# Patient Record
Sex: MALE | Race: WHITE | NOT HISPANIC OR LATINO | Employment: FULL TIME | URBAN - METROPOLITAN AREA
[De-identification: names, ages, dates, MRNs, and addresses within clinical notes are randomized per-mention and may not be internally consistent; named-entity substitution may affect disease eponyms.]

---

## 2023-03-17 DIAGNOSIS — F41.9 ANXIETY: Primary | ICD-10-CM

## 2023-03-17 RX ORDER — HYDROXYZINE HYDROCHLORIDE 25 MG/1
TABLET, FILM COATED ORAL
COMMUNITY
Start: 2022-08-10 | End: 2023-03-17 | Stop reason: SDUPTHER

## 2023-03-17 RX ORDER — ESCITALOPRAM OXALATE 10 MG/1
1 TABLET ORAL DAILY
COMMUNITY
Start: 2022-08-10 | End: 2023-03-17 | Stop reason: SDUPTHER

## 2023-03-17 RX ORDER — AMLODIPINE BESYLATE 5 MG/1
1 TABLET ORAL DAILY
COMMUNITY
Start: 2020-12-18 | End: 2023-06-13 | Stop reason: SDUPTHER

## 2023-03-17 RX ORDER — SIMVASTATIN 10 MG/1
1 TABLET, FILM COATED ORAL DAILY
COMMUNITY
Start: 2021-09-20 | End: 2023-06-13 | Stop reason: SDUPTHER

## 2023-03-17 RX ORDER — NORTRIPTYLINE HYDROCHLORIDE 10 MG/1
CAPSULE ORAL
COMMUNITY
Start: 2020-11-01 | End: 2023-10-24 | Stop reason: ALTCHOICE

## 2023-03-17 RX ORDER — DESONIDE 0.5 MG/G
CREAM TOPICAL
COMMUNITY
Start: 2022-07-18 | End: 2023-10-24 | Stop reason: ALTCHOICE

## 2023-03-17 RX ORDER — METOPROLOL SUCCINATE 50 MG/1
1 TABLET, EXTENDED RELEASE ORAL DAILY
COMMUNITY
Start: 2020-12-18 | End: 2023-06-13 | Stop reason: SDUPTHER

## 2023-03-22 RX ORDER — COVID-19 MOLECULAR TEST ASSAY
KIT MISCELLANEOUS
COMMUNITY
Start: 2022-11-25 | End: 2023-10-24 | Stop reason: ALTCHOICE

## 2023-03-22 RX ORDER — ESCITALOPRAM OXALATE 10 MG/1
10 TABLET ORAL DAILY
Qty: 90 TABLET | Refills: 1 | Status: SHIPPED | OUTPATIENT
Start: 2023-03-22 | End: 2023-06-21 | Stop reason: SDUPTHER

## 2023-03-22 RX ORDER — NIRMATRELVIR AND RITONAVIR 300-100 MG
KIT ORAL
COMMUNITY
Start: 2022-07-31 | End: 2023-10-24 | Stop reason: ALTCHOICE

## 2023-03-22 RX ORDER — HYDROXYZINE HYDROCHLORIDE 25 MG/1
25 TABLET, FILM COATED ORAL EVERY 8 HOURS PRN
Qty: 30 TABLET | Refills: 1 | Status: SHIPPED | OUTPATIENT
Start: 2023-03-22 | End: 2023-12-20 | Stop reason: WASHOUT

## 2023-03-22 RX ORDER — HYDROXYZINE HYDROCHLORIDE 10 MG/1
TABLET, FILM COATED ORAL
COMMUNITY
Start: 2022-08-08

## 2023-03-22 RX ORDER — SODIUM PICOSULFATE, MAGNESIUM OXIDE, AND ANHYDROUS CITRIC ACID 10; 3.5; 12 MG/160ML; G/160ML; G/160ML
LIQUID ORAL
COMMUNITY
Start: 2022-01-11 | End: 2023-10-24 | Stop reason: ALTCHOICE

## 2023-06-13 DIAGNOSIS — I10 HTN (HYPERTENSION), BENIGN: Primary | ICD-10-CM

## 2023-06-13 DIAGNOSIS — E78.5 HYPERLIPIDEMIA, UNSPECIFIED HYPERLIPIDEMIA TYPE: ICD-10-CM

## 2023-06-14 RX ORDER — SIMVASTATIN 10 MG/1
10 TABLET, FILM COATED ORAL DAILY
Qty: 90 TABLET | Refills: 1 | Status: SHIPPED | OUTPATIENT
Start: 2023-06-14 | End: 2023-11-03 | Stop reason: SDUPTHER

## 2023-06-14 RX ORDER — AMLODIPINE BESYLATE 5 MG/1
5 TABLET ORAL DAILY
Qty: 90 TABLET | Refills: 1 | Status: SHIPPED | OUTPATIENT
Start: 2023-06-14 | End: 2023-12-11

## 2023-06-14 RX ORDER — METOPROLOL SUCCINATE 50 MG/1
50 TABLET, EXTENDED RELEASE ORAL DAILY
Qty: 90 TABLET | Refills: 1 | Status: SHIPPED | OUTPATIENT
Start: 2023-06-14 | End: 2023-12-11

## 2023-06-20 ENCOUNTER — OFFICE VISIT (OUTPATIENT)
Dept: PRIMARY CARE | Facility: CLINIC | Age: 68
End: 2023-06-20
Payer: COMMERCIAL

## 2023-06-20 VITALS
TEMPERATURE: 97.8 F | RESPIRATION RATE: 18 BRPM | SYSTOLIC BLOOD PRESSURE: 138 MMHG | WEIGHT: 165.2 LBS | HEIGHT: 67 IN | OXYGEN SATURATION: 91 % | DIASTOLIC BLOOD PRESSURE: 74 MMHG | HEART RATE: 73 BPM | BODY MASS INDEX: 25.93 KG/M2

## 2023-06-20 DIAGNOSIS — C61 ADENOCARCINOMA OF PROSTATE (MULTI): ICD-10-CM

## 2023-06-20 DIAGNOSIS — I10 BENIGN ESSENTIAL HYPERTENSION: Primary | ICD-10-CM

## 2023-06-20 DIAGNOSIS — E78.2 HYPERLIPEMIA, MIXED: ICD-10-CM

## 2023-06-20 DIAGNOSIS — F41.1 GENERALIZED ANXIETY DISORDER: ICD-10-CM

## 2023-06-20 DIAGNOSIS — G47.00 INSOMNIA, UNSPECIFIED TYPE: ICD-10-CM

## 2023-06-20 PROCEDURE — 1036F TOBACCO NON-USER: CPT | Performed by: FAMILY MEDICINE

## 2023-06-20 PROCEDURE — 1159F MED LIST DOCD IN RCRD: CPT | Performed by: FAMILY MEDICINE

## 2023-06-20 PROCEDURE — 3075F SYST BP GE 130 - 139MM HG: CPT | Performed by: FAMILY MEDICINE

## 2023-06-20 PROCEDURE — 99214 OFFICE O/P EST MOD 30 MIN: CPT | Performed by: FAMILY MEDICINE

## 2023-06-20 PROCEDURE — 3078F DIAST BP <80 MM HG: CPT | Performed by: FAMILY MEDICINE

## 2023-06-20 ASSESSMENT — ENCOUNTER SYMPTOMS
UNEXPECTED WEIGHT CHANGE: 0
PALPITATIONS: 0
HEADACHES: 0
ABDOMINAL PAIN: 0
SHORTNESS OF BREATH: 0
HYPERTENSION: 1

## 2023-06-20 NOTE — ASSESSMENT & PLAN NOTE
Elevated, recommend monitor home pressures  Continue current regimen  Avoid salty foods, maintain hydration

## 2023-06-20 NOTE — PROGRESS NOTES
"Subjective   Patient ID: Medardo Mcdonald is a 67 y.o. male who presents for Follow-up and Hypertension.    Hypertension  Pertinent negatives include no chest pain, headaches, palpitations or shortness of breath.      Urology: Dr. Mendez, monitoring PSA, sees now once a year  HTN:  BP usually elevated on initial read, does not check home pressures, denies any headache, chest pain or SOB  HLD: well controlled on statin, tries to watch diet     Review of Systems   Constitutional:  Negative for unexpected weight change.   Eyes:  Negative for visual disturbance.   Respiratory:  Negative for shortness of breath.    Cardiovascular:  Negative for chest pain, palpitations and leg swelling.   Gastrointestinal:  Negative for abdominal pain.   Neurological:  Negative for headaches.       Objective   /86 (BP Location: Left arm, Patient Position: Sitting) Comment: happens here  Pulse 73   Temp 36.6 °C (97.8 °F) (Temporal)   Resp 18   Ht 1.702 m (5' 7.01\")   Wt 74.9 kg (165 lb 3.2 oz)   SpO2 91%   BMI 25.87 kg/m²     Physical Exam  Constitutional:       Appearance: He is normal weight.   Eyes:      Extraocular Movements: Extraocular movements intact.      Pupils: Pupils are equal, round, and reactive to light.   Cardiovascular:      Rate and Rhythm: Normal rate and regular rhythm.      Heart sounds: No murmur heard.     No gallop.   Pulmonary:      Effort: Pulmonary effort is normal.      Breath sounds: Normal breath sounds.   Musculoskeletal:      Cervical back: Normal range of motion.   Neurological:      General: No focal deficit present.      Mental Status: He is alert and oriented to person, place, and time.         Assessment/Plan   Problem List Items Addressed This Visit       Benign essential hypertension - Primary     Elevated, recommend monitor home pressures  Continue current regimen  Avoid salty foods, maintain hydration         Adenocarcinoma of prostate (CMS/HCC)     Follows with urology, Dr. Mendez         " Generalized anxiety disorder     Doing well with LExapro         Hyperlipemia, mixed     Lipids within normal  Continue statin  Repeat labs on next visit         Insomnia     Does well on 10mg of Nortriptyline

## 2023-06-21 DIAGNOSIS — F41.9 ANXIETY: ICD-10-CM

## 2023-06-21 RX ORDER — ESCITALOPRAM OXALATE 10 MG/1
10 TABLET ORAL DAILY
Qty: 90 TABLET | Refills: 3 | Status: SHIPPED | OUTPATIENT
Start: 2023-06-21

## 2023-06-21 RX ORDER — ESCITALOPRAM OXALATE 10 MG/1
10 TABLET ORAL DAILY
Qty: 90 TABLET | Refills: 3 | Status: SHIPPED | OUTPATIENT
Start: 2023-06-21 | End: 2023-06-21 | Stop reason: SDUPTHER

## 2023-10-24 ENCOUNTER — OFFICE VISIT (OUTPATIENT)
Dept: PRIMARY CARE | Facility: CLINIC | Age: 68
End: 2023-10-24
Payer: COMMERCIAL

## 2023-10-24 VITALS
TEMPERATURE: 97.6 F | SYSTOLIC BLOOD PRESSURE: 148 MMHG | DIASTOLIC BLOOD PRESSURE: 82 MMHG | HEART RATE: 68 BPM | BODY MASS INDEX: 25.99 KG/M2 | HEIGHT: 67 IN | WEIGHT: 165.6 LBS | OXYGEN SATURATION: 96 %

## 2023-10-24 DIAGNOSIS — I10 BENIGN ESSENTIAL HYPERTENSION: Primary | ICD-10-CM

## 2023-10-24 DIAGNOSIS — I10 BENIGN ESSENTIAL HYPERTENSION: ICD-10-CM

## 2023-10-24 PROCEDURE — 1160F RVW MEDS BY RX/DR IN RCRD: CPT | Performed by: FAMILY MEDICINE

## 2023-10-24 PROCEDURE — 1036F TOBACCO NON-USER: CPT | Performed by: FAMILY MEDICINE

## 2023-10-24 PROCEDURE — 93000 ELECTROCARDIOGRAM COMPLETE: CPT | Performed by: FAMILY MEDICINE

## 2023-10-24 PROCEDURE — 1159F MED LIST DOCD IN RCRD: CPT | Performed by: FAMILY MEDICINE

## 2023-10-24 PROCEDURE — 3079F DIAST BP 80-89 MM HG: CPT | Performed by: FAMILY MEDICINE

## 2023-10-24 PROCEDURE — 1126F AMNT PAIN NOTED NONE PRSNT: CPT | Performed by: FAMILY MEDICINE

## 2023-10-24 PROCEDURE — 3077F SYST BP >= 140 MM HG: CPT | Performed by: FAMILY MEDICINE

## 2023-10-24 PROCEDURE — 99214 OFFICE O/P EST MOD 30 MIN: CPT | Performed by: FAMILY MEDICINE

## 2023-10-24 RX ORDER — LOSARTAN POTASSIUM 25 MG/1
25 TABLET ORAL DAILY
Qty: 30 TABLET | Refills: 1 | Status: SHIPPED | OUTPATIENT
Start: 2023-10-24 | End: 2023-10-24

## 2023-10-24 RX ORDER — LOSARTAN POTASSIUM 25 MG/1
25 TABLET ORAL DAILY
Qty: 90 TABLET | Refills: 1 | Status: SHIPPED | OUTPATIENT
Start: 2023-10-24 | End: 2024-01-23 | Stop reason: SDUPTHER

## 2023-10-24 ASSESSMENT — PATIENT HEALTH QUESTIONNAIRE - PHQ9
SUM OF ALL RESPONSES TO PHQ9 QUESTIONS 1 AND 2: 0
2. FEELING DOWN, DEPRESSED OR HOPELESS: NOT AT ALL
1. LITTLE INTEREST OR PLEASURE IN DOING THINGS: NOT AT ALL

## 2023-10-24 NOTE — PROGRESS NOTES
"Subjective   Patient ID: Medardo Mcdonald is a 68 y.o. male who presents for Hypertension and Dizziness.    HPI   4 days ago, developed dizziness, took BP and it was elevaed  BP went up to 175/80s, came down to 155/75  Has not had any recurrent dizziness since a couple days ago  Did not have any other symptoms such as chest pain, SOB, LE swelling  Nothing else was different since that time, feeling fine other than some elevation in BP that has persisted    Review of Systems  Negative unless noted in HPI    Objective   /82   Pulse 68   Temp 36.4 °C (97.6 °F) (Temporal)   Ht 1.702 m (5' 7\")   Wt 75.1 kg (165 lb 9.6 oz)   SpO2 96%   BMI 25.94 kg/m²     Physical Exam  Constitutional:       Appearance: Normal appearance.   HENT:      Head: Normocephalic and atraumatic.      Right Ear: Tympanic membrane and ear canal normal.      Left Ear: Tympanic membrane and ear canal normal.   Eyes:      Extraocular Movements: Extraocular movements intact.      Pupils: Pupils are equal, round, and reactive to light.   Cardiovascular:      Rate and Rhythm: Normal rate and regular rhythm.      Heart sounds: Normal heart sounds.   Pulmonary:      Effort: Pulmonary effort is normal.      Breath sounds: Normal breath sounds.   Neurological:      General: No focal deficit present.      Mental Status: He is alert.   Psychiatric:         Mood and Affect: Mood normal.         Assessment/Plan   Problem List Items Addressed This Visit             ICD-10-CM    Benign essential hypertension - Primary I10     Persistent elevation in BP, while improved from pressure over the weekend it does remain elevated  No acute abnormalities on EKG but LVH indicates longstanding BP that has not been well controlled  Recommend additional of 3rd agent, start Losartan 25mg, can adjust as needed  Will obtain labs to make sure no other cause of recent elevation, dizziness  Recommend further risk stratification with cardiac calcium score, test " ordered  Discussed with pt that if any persistent or worsening BP elevation, development of new symptoms like severe headache, unilateral neurologic changes or chest pain/SOB, diaphoresis then should seek emergent care         Relevant Orders    ECG 12 Lead (Completed)    Lipid Panel    Comprehensive Metabolic Panel    CBC    CT cardiac scoring wo IV contrast

## 2023-10-24 NOTE — ASSESSMENT & PLAN NOTE
Persistent elevation in BP, while improved from pressure over the weekend it does remain elevated  No acute abnormalities on EKG but LVH indicates longstanding BP that has not been well controlled  Recommend additional of 3rd agent, start Losartan 25mg, can adjust as needed  Will obtain labs to make sure no other cause of recent elevation, dizziness  Recommend further risk stratification with cardiac calcium score, test ordered  Discussed with pt that if any persistent or worsening BP elevation, development of new symptoms like severe headache, unilateral neurologic changes or chest pain/SOB, diaphoresis then should seek emergent care

## 2023-10-25 ENCOUNTER — LAB (OUTPATIENT)
Dept: LAB | Facility: LAB | Age: 68
End: 2023-10-25
Payer: COMMERCIAL

## 2023-10-25 DIAGNOSIS — I10 BENIGN ESSENTIAL HYPERTENSION: ICD-10-CM

## 2023-10-25 LAB
ALBUMIN SERPL BCP-MCNC: 4.3 G/DL (ref 3.4–5)
ALP SERPL-CCNC: 64 U/L (ref 33–136)
ALT SERPL W P-5'-P-CCNC: 20 U/L (ref 10–52)
ANION GAP SERPL CALC-SCNC: 17 MMOL/L (ref 10–20)
AST SERPL W P-5'-P-CCNC: 13 U/L (ref 9–39)
BILIRUB SERPL-MCNC: 0.5 MG/DL (ref 0–1.2)
BUN SERPL-MCNC: 18 MG/DL (ref 6–23)
CALCIUM SERPL-MCNC: 9 MG/DL (ref 8.6–10.6)
CHLORIDE SERPL-SCNC: 107 MMOL/L (ref 98–107)
CHOLEST SERPL-MCNC: 137 MG/DL (ref 0–199)
CHOLESTEROL/HDL RATIO: 2.9
CO2 SERPL-SCNC: 25 MMOL/L (ref 21–32)
CREAT SERPL-MCNC: 1.08 MG/DL (ref 0.5–1.3)
ERYTHROCYTE [DISTWIDTH] IN BLOOD BY AUTOMATED COUNT: 11.8 % (ref 11.5–14.5)
GFR SERPL CREATININE-BSD FRML MDRD: 75 ML/MIN/1.73M*2
GLUCOSE SERPL-MCNC: 118 MG/DL (ref 74–99)
HCT VFR BLD AUTO: 42.3 % (ref 41–52)
HDLC SERPL-MCNC: 46.5 MG/DL
HGB BLD-MCNC: 14.1 G/DL (ref 13.5–17.5)
LDLC SERPL CALC-MCNC: 66 MG/DL
MCH RBC QN AUTO: 30.6 PG (ref 26–34)
MCHC RBC AUTO-ENTMCNC: 33.3 G/DL (ref 32–36)
MCV RBC AUTO: 92 FL (ref 80–100)
NON HDL CHOLESTEROL: 91 MG/DL (ref 0–149)
NRBC BLD-RTO: 0 /100 WBCS (ref 0–0)
PLATELET # BLD AUTO: 185 X10*3/UL (ref 150–450)
PMV BLD AUTO: 9.2 FL (ref 7.5–11.5)
POTASSIUM SERPL-SCNC: 4.5 MMOL/L (ref 3.5–5.3)
PROT SERPL-MCNC: 6.5 G/DL (ref 6.4–8.2)
RBC # BLD AUTO: 4.61 X10*6/UL (ref 4.5–5.9)
SODIUM SERPL-SCNC: 144 MMOL/L (ref 136–145)
TRIGL SERPL-MCNC: 123 MG/DL (ref 0–149)
VLDL: 25 MG/DL (ref 0–40)
WBC # BLD AUTO: 5.7 X10*3/UL (ref 4.4–11.3)

## 2023-10-25 PROCEDURE — 36415 COLL VENOUS BLD VENIPUNCTURE: CPT

## 2023-10-25 PROCEDURE — 80061 LIPID PANEL: CPT

## 2023-10-25 PROCEDURE — 80053 COMPREHEN METABOLIC PANEL: CPT

## 2023-10-25 PROCEDURE — 85027 COMPLETE CBC AUTOMATED: CPT

## 2023-10-31 ENCOUNTER — HOSPITAL ENCOUNTER (OUTPATIENT)
Dept: RADIOLOGY | Facility: HOSPITAL | Age: 68
Discharge: HOME | End: 2023-10-31
Payer: COMMERCIAL

## 2023-10-31 DIAGNOSIS — I10 BENIGN ESSENTIAL HYPERTENSION: ICD-10-CM

## 2023-10-31 PROCEDURE — 75571 CT HRT W/O DYE W/CA TEST: CPT

## 2023-11-03 DIAGNOSIS — E78.5 HYPERLIPIDEMIA, UNSPECIFIED HYPERLIPIDEMIA TYPE: ICD-10-CM

## 2023-11-03 RX ORDER — SIMVASTATIN 40 MG/1
40 TABLET, FILM COATED ORAL DAILY
Qty: 90 TABLET | Refills: 3 | Status: SHIPPED | OUTPATIENT
Start: 2023-11-03 | End: 2024-04-25

## 2023-12-10 DIAGNOSIS — I10 HTN (HYPERTENSION), BENIGN: ICD-10-CM

## 2023-12-11 RX ORDER — AMLODIPINE BESYLATE 5 MG/1
5 TABLET ORAL DAILY
Qty: 90 TABLET | Refills: 3 | Status: SHIPPED | OUTPATIENT
Start: 2023-12-11

## 2023-12-11 RX ORDER — METOPROLOL SUCCINATE 50 MG/1
50 TABLET, EXTENDED RELEASE ORAL DAILY
Qty: 90 TABLET | Refills: 3 | Status: SHIPPED | OUTPATIENT
Start: 2023-12-11

## 2023-12-13 ENCOUNTER — TELEPHONE (OUTPATIENT)
Dept: PRIMARY CARE | Facility: CLINIC | Age: 68
End: 2023-12-13
Payer: COMMERCIAL

## 2023-12-20 ENCOUNTER — OFFICE VISIT (OUTPATIENT)
Dept: PRIMARY CARE | Facility: CLINIC | Age: 68
End: 2023-12-20
Payer: COMMERCIAL

## 2023-12-20 VITALS
BODY MASS INDEX: 25.8 KG/M2 | DIASTOLIC BLOOD PRESSURE: 72 MMHG | HEART RATE: 70 BPM | HEIGHT: 67 IN | WEIGHT: 164.4 LBS | OXYGEN SATURATION: 95 % | TEMPERATURE: 97.5 F | SYSTOLIC BLOOD PRESSURE: 138 MMHG

## 2023-12-20 DIAGNOSIS — I10 BENIGN ESSENTIAL HYPERTENSION: Primary | ICD-10-CM

## 2023-12-20 PROCEDURE — 1036F TOBACCO NON-USER: CPT | Performed by: FAMILY MEDICINE

## 2023-12-20 PROCEDURE — 3074F SYST BP LT 130 MM HG: CPT | Performed by: FAMILY MEDICINE

## 2023-12-20 PROCEDURE — 1126F AMNT PAIN NOTED NONE PRSNT: CPT | Performed by: FAMILY MEDICINE

## 2023-12-20 PROCEDURE — 3078F DIAST BP <80 MM HG: CPT | Performed by: FAMILY MEDICINE

## 2023-12-20 PROCEDURE — 99213 OFFICE O/P EST LOW 20 MIN: CPT | Performed by: FAMILY MEDICINE

## 2023-12-20 PROCEDURE — 1160F RVW MEDS BY RX/DR IN RCRD: CPT | Performed by: FAMILY MEDICINE

## 2023-12-20 PROCEDURE — 1159F MED LIST DOCD IN RCRD: CPT | Performed by: FAMILY MEDICINE

## 2023-12-20 ASSESSMENT — PATIENT HEALTH QUESTIONNAIRE - PHQ9
SUM OF ALL RESPONSES TO PHQ9 QUESTIONS 1 AND 2: 0
1. LITTLE INTEREST OR PLEASURE IN DOING THINGS: NOT AT ALL
2. FEELING DOWN, DEPRESSED OR HOPELESS: NOT AT ALL

## 2023-12-20 ASSESSMENT — ENCOUNTER SYMPTOMS
OCCASIONAL FEELINGS OF UNSTEADINESS: 0
LOSS OF SENSATION IN FEET: 0
DEPRESSION: 0

## 2023-12-20 ASSESSMENT — PAIN SCALES - GENERAL: PAINLEVEL: 0-NO PAIN

## 2023-12-20 NOTE — PROGRESS NOTES
"Subjective   Patient ID: Medardo Mcdonald is a 68 y.o. male who presents for Hypertension.    HPI   HTN: home Bps are inconsistent, taking with wrist cuff and sometimes arm, again fairly inconsistent ranges with no regular pattern, most are elevated though     Feeling well without any symptoms or problems.  No headache, vision change, SOB or LE swelling    No repeat episodes of dizziness   Review of Systems  Negative unless noted in HPI    Objective   /72   Pulse 70   Temp 36.4 °C (97.5 °F) (Temporal)   Ht 1.702 m (5' 7\")   Wt 74.6 kg (164 lb 6.4 oz)   SpO2 95%   BMI 25.75 kg/m²     Physical Exam  Constitutional:       Appearance: He is normal weight.   Eyes:      Extraocular Movements: Extraocular movements intact.      Pupils: Pupils are equal, round, and reactive to light.   Cardiovascular:      Rate and Rhythm: Normal rate and regular rhythm.      Heart sounds: No murmur heard.     No gallop.   Pulmonary:      Effort: Pulmonary effort is normal.      Breath sounds: Normal breath sounds.   Musculoskeletal:      Cervical back: Normal range of motion.   Neurological:      General: No focal deficit present.      Mental Status: He is alert and oriented to person, place, and time.         Assessment/Plan   Problem List Items Addressed This Visit             ICD-10-CM    Benign essential hypertension - Primary I10     Office pressure improved from previous  Review of home pressures show erratic pattern with Bps of 210/120 and 10 minutes later 150/80, some Bps are 160/123 and therefore not physiologically consistent   Suspect home cuff may be off  Will continue close follow up  Recommend obtaining with arm cuff and not wrist  Measure for 4 weeks and return for in office follow up with cuff to calibrate; will adjust accordingly if needed  Did discuss red flag symptoms which would warrant earlier evaluation               "

## 2023-12-20 NOTE — ASSESSMENT & PLAN NOTE
Office pressure improved from previous  Review of home pressures show erratic pattern with Bps of 210/120 and 10 minutes later 150/80, some Bps are 160/123 and therefore not physiologically consistent   Suspect home cuff may be off  Will continue close follow up  Recommend obtaining with arm cuff and not wrist  Measure for 4 weeks and return for in office follow up with cuff to calibrate; will adjust accordingly if needed  Did discuss red flag symptoms which would warrant earlier evaluation

## 2024-01-22 ASSESSMENT — ENCOUNTER SYMPTOMS
NECK PAIN: 0
SWEATS: 1
BLURRED VISION: 0
ORTHOPNEA: 0
HEADACHES: 0
HYPERTENSION: 1
PND: 0
PALPITATIONS: 0
SHORTNESS OF BREATH: 0

## 2024-01-23 ENCOUNTER — OFFICE VISIT (OUTPATIENT)
Dept: PRIMARY CARE | Facility: CLINIC | Age: 69
End: 2024-01-23
Payer: COMMERCIAL

## 2024-01-23 VITALS
BODY MASS INDEX: 26.49 KG/M2 | DIASTOLIC BLOOD PRESSURE: 70 MMHG | OXYGEN SATURATION: 98 % | SYSTOLIC BLOOD PRESSURE: 142 MMHG | WEIGHT: 168.8 LBS | TEMPERATURE: 97.6 F | HEIGHT: 67 IN | HEART RATE: 74 BPM

## 2024-01-23 DIAGNOSIS — R05.1 ACUTE COUGH: ICD-10-CM

## 2024-01-23 DIAGNOSIS — I10 BENIGN ESSENTIAL HYPERTENSION: Primary | ICD-10-CM

## 2024-01-23 PROCEDURE — 1126F AMNT PAIN NOTED NONE PRSNT: CPT | Performed by: FAMILY MEDICINE

## 2024-01-23 PROCEDURE — 3078F DIAST BP <80 MM HG: CPT | Performed by: FAMILY MEDICINE

## 2024-01-23 PROCEDURE — 3077F SYST BP >= 140 MM HG: CPT | Performed by: FAMILY MEDICINE

## 2024-01-23 PROCEDURE — 1159F MED LIST DOCD IN RCRD: CPT | Performed by: FAMILY MEDICINE

## 2024-01-23 PROCEDURE — 99214 OFFICE O/P EST MOD 30 MIN: CPT | Performed by: FAMILY MEDICINE

## 2024-01-23 PROCEDURE — 1036F TOBACCO NON-USER: CPT | Performed by: FAMILY MEDICINE

## 2024-01-23 PROCEDURE — 1160F RVW MEDS BY RX/DR IN RCRD: CPT | Performed by: FAMILY MEDICINE

## 2024-01-23 RX ORDER — LOSARTAN POTASSIUM 50 MG/1
50 TABLET ORAL DAILY
Qty: 90 TABLET | Refills: 1 | Status: SHIPPED | OUTPATIENT
Start: 2024-01-23

## 2024-01-23 ASSESSMENT — ENCOUNTER SYMPTOMS
PND: 0
DEPRESSION: 0
OCCASIONAL FEELINGS OF UNSTEADINESS: 0
HEADACHES: 0
LOSS OF SENSATION IN FEET: 0
ORTHOPNEA: 0
SWEATS: 1
HYPERTENSION: 1
SHORTNESS OF BREATH: 0
NECK PAIN: 0
PALPITATIONS: 0
BLURRED VISION: 0

## 2024-01-23 ASSESSMENT — PATIENT HEALTH QUESTIONNAIRE - PHQ9
1. LITTLE INTEREST OR PLEASURE IN DOING THINGS: NOT AT ALL
SUM OF ALL RESPONSES TO PHQ9 QUESTIONS 1 AND 2: 0
2. FEELING DOWN, DEPRESSED OR HOPELESS: NOT AT ALL

## 2024-01-23 ASSESSMENT — PAIN SCALES - GENERAL: PAINLEVEL: 0-NO PAIN

## 2024-01-23 NOTE — ASSESSMENT & PLAN NOTE
Suspect secondary to PND, continue to monitor  If it does not improve or resolve with conservative management as discussed during appt then will need to evaluate further

## 2024-01-23 NOTE — ASSESSMENT & PLAN NOTE
Comparison to home cuff in office  Manual showed 142/70 and home automatic had 150/80  Home pressures showed BP of max 169-131/70s diastolic  Based on this comparison, will increase Losartan to 50mg  Continue to monitor  Take other meds as prescribed  Avoid excessive sodium, processed foods  Follow up if fails to improve or otherwise in 3 months

## 2024-01-23 NOTE — PROGRESS NOTES
"Subjective   Patient ID: Medardo Mcdonald is a 68 y.o. male who presents for Hypertension.    Hypertension  This is a chronic problem. The current episode started more than 1 year ago. The problem has been waxing and waning since onset. The problem is controlled. Associated symptoms include sweats. Pertinent negatives include no anxiety, blurred vision, chest pain, headaches, malaise/fatigue, neck pain, orthopnea, palpitations, peripheral edema, PND or shortness of breath. There are no associated agents to hypertension. Risk factors for coronary artery disease include dyslipidemia. There are no compliance problems.         Has noticed a dry cough on and off for the last month.  Very intermittent, not bothersome.  Notices when talking and resolves with some water intake.     Review of Systems   Constitutional:  Negative for malaise/fatigue.   Eyes:  Negative for blurred vision.   Respiratory:  Negative for shortness of breath.    Cardiovascular:  Negative for chest pain, palpitations, orthopnea and PND.   Musculoskeletal:  Negative for neck pain.   Neurological:  Negative for headaches.       Objective   /70 (BP Location: Right arm, Patient Position: Sitting, BP Cuff Size: Large adult)   Pulse 74   Temp 36.4 °C (97.6 °F) (Temporal)   Ht 1.702 m (5' 7\")   Wt 76.6 kg (168 lb 12.8 oz)   SpO2 98%   BMI 26.44 kg/m²     Physical Exam  Constitutional:       Appearance: He is normal weight.   Eyes:      Extraocular Movements: Extraocular movements intact.      Pupils: Pupils are equal, round, and reactive to light.   Cardiovascular:      Rate and Rhythm: Normal rate and regular rhythm.      Heart sounds: No murmur heard.     No gallop.   Pulmonary:      Effort: Pulmonary effort is normal.      Breath sounds: Normal breath sounds.   Musculoskeletal:      Cervical back: Normal range of motion.   Neurological:      General: No focal deficit present.      Mental Status: He is alert and oriented to person, place, and " time.         Assessment/Plan   Problem List Items Addressed This Visit             ICD-10-CM    Benign essential hypertension - Primary I10     Comparison to home cuff in office  Manual showed 142/70 and home automatic had 150/80  Home pressures showed BP of max 169-131/70s diastolic  Based on this comparison, will increase Losartan to 50mg  Continue to monitor  Take other meds as prescribed  Avoid excessive sodium, processed foods  Follow up if fails to improve or otherwise in 3 months         Relevant Medications    losartan (Cozaar) 50 mg tablet    Acute cough R05.1     Suspect secondary to PND, continue to monitor  If it does not improve or resolve with conservative management as discussed during appt then will need to evaluate further

## 2024-02-05 ENCOUNTER — LAB (OUTPATIENT)
Dept: LAB | Facility: LAB | Age: 69
End: 2024-02-05
Payer: COMMERCIAL

## 2024-02-05 DIAGNOSIS — C61 MALIGNANT NEOPLASM OF PROSTATE (MULTI): Primary | ICD-10-CM

## 2024-02-05 LAB — PSA SERPL-MCNC: 4.08 NG/ML

## 2024-02-05 PROCEDURE — 36415 COLL VENOUS BLD VENIPUNCTURE: CPT

## 2024-02-05 PROCEDURE — 84153 ASSAY OF PSA TOTAL: CPT

## 2024-04-24 DIAGNOSIS — E78.5 HYPERLIPIDEMIA, UNSPECIFIED HYPERLIPIDEMIA TYPE: ICD-10-CM

## 2024-04-25 RX ORDER — SIMVASTATIN 40 MG/1
40 TABLET, FILM COATED ORAL NIGHTLY
Qty: 90 TABLET | Refills: 3 | Status: SHIPPED | OUTPATIENT
Start: 2024-04-25

## 2024-05-01 ENCOUNTER — OFFICE VISIT (OUTPATIENT)
Dept: PRIMARY CARE | Facility: CLINIC | Age: 69
End: 2024-05-01
Payer: COMMERCIAL

## 2024-05-01 VITALS
HEART RATE: 73 BPM | WEIGHT: 169.6 LBS | HEIGHT: 67 IN | OXYGEN SATURATION: 94 % | BODY MASS INDEX: 26.62 KG/M2 | DIASTOLIC BLOOD PRESSURE: 78 MMHG | SYSTOLIC BLOOD PRESSURE: 130 MMHG

## 2024-05-01 DIAGNOSIS — F41.1 GENERALIZED ANXIETY DISORDER: Primary | ICD-10-CM

## 2024-05-01 DIAGNOSIS — C61 ADENOCARCINOMA OF PROSTATE (MULTI): ICD-10-CM

## 2024-05-01 DIAGNOSIS — I10 BENIGN ESSENTIAL HYPERTENSION: ICD-10-CM

## 2024-05-01 PROCEDURE — 1159F MED LIST DOCD IN RCRD: CPT | Performed by: FAMILY MEDICINE

## 2024-05-01 PROCEDURE — 1158F ADVNC CARE PLAN TLK DOCD: CPT | Performed by: FAMILY MEDICINE

## 2024-05-01 PROCEDURE — 1123F ACP DISCUSS/DSCN MKR DOCD: CPT | Performed by: FAMILY MEDICINE

## 2024-05-01 PROCEDURE — 1036F TOBACCO NON-USER: CPT | Performed by: FAMILY MEDICINE

## 2024-05-01 PROCEDURE — 99214 OFFICE O/P EST MOD 30 MIN: CPT | Performed by: FAMILY MEDICINE

## 2024-05-01 PROCEDURE — 3078F DIAST BP <80 MM HG: CPT | Performed by: FAMILY MEDICINE

## 2024-05-01 PROCEDURE — 1160F RVW MEDS BY RX/DR IN RCRD: CPT | Performed by: FAMILY MEDICINE

## 2024-05-01 PROCEDURE — 3075F SYST BP GE 130 - 139MM HG: CPT | Performed by: FAMILY MEDICINE

## 2024-05-01 ASSESSMENT — ENCOUNTER SYMPTOMS
DEPRESSION: 0
SHORTNESS OF BREATH: 0
HEADACHES: 0
ABDOMINAL PAIN: 0
PALPITATIONS: 0
LOSS OF SENSATION IN FEET: 0
OCCASIONAL FEELINGS OF UNSTEADINESS: 0
UNEXPECTED WEIGHT CHANGE: 0

## 2024-05-01 NOTE — ASSESSMENT & PLAN NOTE
Stable control  Continue on current regimen  Monitor home Bps  Follow up in 6 months or earlier if home pressures are elevated or new symptoms

## 2024-05-01 NOTE — PROGRESS NOTES
"Subjective   Patient ID: Medardo Mcdonald is a 68 y.o. male who presents for 3 month follow up  (Blood pressure ).    HPI   Presents for follow up for blood pressure.  Overall doing well and tolerating medication without any issues.  Has not been monitoring at home secondary to significant stressors.  Brother recently passed away unexpectedly which was difficult to manage but now doing better.      Going on a trip to [place] soon and looking forward to it.    Review of Systems   Constitutional:  Negative for unexpected weight change.   Eyes:  Negative for visual disturbance.   Respiratory:  Negative for shortness of breath.    Cardiovascular:  Negative for chest pain, palpitations and leg swelling.   Gastrointestinal:  Negative for abdominal pain.   Neurological:  Negative for headaches.       Objective   /78   Pulse 73   Ht 1.702 m (5' 7\")   Wt 76.9 kg (169 lb 9.6 oz)   SpO2 94%   BMI 26.56 kg/m²     Physical Exam  Constitutional:       Appearance: He is normal weight.   Cardiovascular:      Rate and Rhythm: Normal rate and regular rhythm.   Pulmonary:      Effort: Pulmonary effort is normal.      Breath sounds: Normal breath sounds.   Musculoskeletal:      Cervical back: Normal range of motion.   Neurological:      General: No focal deficit present.      Mental Status: He is alert and oriented to person, place, and time.   Psychiatric:         Mood and Affect: Mood normal.         Assessment/Plan   Problem List Items Addressed This Visit             ICD-10-CM    Benign essential hypertension I10     Stable control  Continue on current regimen  Monitor home Bps  Follow up in 6 months or earlier if home pressures are elevated or new symptoms         Adenocarcinoma of prostate (Multi) C61     Follows with urology regularly, PSA up to date         Generalized anxiety disorder - Primary F41.1     Feels like well controlled, would like to continue his current medications               "

## 2024-06-22 DIAGNOSIS — F41.9 ANXIETY: ICD-10-CM

## 2024-06-24 RX ORDER — ESCITALOPRAM OXALATE 10 MG/1
10 TABLET ORAL DAILY
Qty: 90 TABLET | Refills: 3 | Status: SHIPPED | OUTPATIENT
Start: 2024-06-24

## 2024-07-23 DIAGNOSIS — I10 BENIGN ESSENTIAL HYPERTENSION: ICD-10-CM

## 2024-07-24 RX ORDER — LOSARTAN POTASSIUM 50 MG/1
50 TABLET ORAL DAILY
Qty: 90 TABLET | Refills: 3 | Status: SHIPPED | OUTPATIENT
Start: 2024-07-24

## 2024-09-16 ENCOUNTER — OFFICE VISIT (OUTPATIENT)
Dept: PRIMARY CARE | Facility: CLINIC | Age: 69
End: 2024-09-16
Payer: COMMERCIAL

## 2024-09-16 VITALS
BODY MASS INDEX: 26.53 KG/M2 | HEIGHT: 67 IN | TEMPERATURE: 97.8 F | WEIGHT: 169 LBS | HEART RATE: 76 BPM | DIASTOLIC BLOOD PRESSURE: 80 MMHG | SYSTOLIC BLOOD PRESSURE: 148 MMHG | OXYGEN SATURATION: 97 %

## 2024-09-16 DIAGNOSIS — J01.90 ACUTE SINUSITIS, RECURRENCE NOT SPECIFIED, UNSPECIFIED LOCATION: Primary | ICD-10-CM

## 2024-09-16 PROCEDURE — 1159F MED LIST DOCD IN RCRD: CPT | Performed by: NURSE PRACTITIONER

## 2024-09-16 PROCEDURE — 3008F BODY MASS INDEX DOCD: CPT | Performed by: NURSE PRACTITIONER

## 2024-09-16 PROCEDURE — 1126F AMNT PAIN NOTED NONE PRSNT: CPT | Performed by: NURSE PRACTITIONER

## 2024-09-16 PROCEDURE — 1160F RVW MEDS BY RX/DR IN RCRD: CPT | Performed by: NURSE PRACTITIONER

## 2024-09-16 PROCEDURE — 99213 OFFICE O/P EST LOW 20 MIN: CPT | Performed by: NURSE PRACTITIONER

## 2024-09-16 PROCEDURE — 3077F SYST BP >= 140 MM HG: CPT | Performed by: NURSE PRACTITIONER

## 2024-09-16 PROCEDURE — 1123F ACP DISCUSS/DSCN MKR DOCD: CPT | Performed by: NURSE PRACTITIONER

## 2024-09-16 PROCEDURE — 3079F DIAST BP 80-89 MM HG: CPT | Performed by: NURSE PRACTITIONER

## 2024-09-16 RX ORDER — AMOXICILLIN AND CLAVULANATE POTASSIUM 875; 125 MG/1; MG/1
875 TABLET, FILM COATED ORAL 2 TIMES DAILY
Qty: 20 TABLET | Refills: 0 | Status: SHIPPED | OUTPATIENT
Start: 2024-09-16 | End: 2024-09-26

## 2024-09-16 ASSESSMENT — PAIN SCALES - GENERAL: PAINLEVEL: 0-NO PAIN

## 2024-09-16 NOTE — PROGRESS NOTES
"Subjective   Patient ID: Medardo Mcdonald is a 69 y.o. male who presents for Cough and Sore Throat    Presents for sinus congestion for one week  Last week on Sunday with cough, later sore throat and now worsening  Is a little better now  Has been using OTC cough med for sore thraot        Review of Systems    Objective     /80 (BP Location: Right arm, Patient Position: Sitting, BP Cuff Size: Large adult)   Pulse 76   Temp 36.6 °C (97.8 °F) (Temporal)   Ht 1.702 m (5' 7\")   Wt 76.7 kg (169 lb)   SpO2 97%   BMI 26.47 kg/m²      Physical Exam  Vitals and nursing note reviewed.   Constitutional:       Appearance: Normal appearance. He is ill-appearing. He is not toxic-appearing.   HENT:      Head: Normocephalic.      Right Ear: Ear canal and external ear normal. A middle ear effusion is present.      Left Ear: Ear canal and external ear normal. A middle ear effusion is present.      Nose: Nose normal. No congestion.      Mouth/Throat:      Mouth: Mucous membranes are moist.   Cardiovascular:      Rate and Rhythm: Regular rhythm.      Heart sounds: Normal heart sounds. No murmur heard.  Pulmonary:      Effort: Pulmonary effort is normal. No respiratory distress.      Breath sounds: Normal breath sounds.   Lymphadenopathy:      Cervical: Cervical adenopathy present.          Assessment/Plan   Diagnoses and all orders for this visit:  Acute sinusitis, recurrence not specified, unspecified location  -     amoxicillin-pot clavulanate (Augmentin) 875-125 mg tablet; Take 1 tablet (875 mg) by mouth 2 times a day for 10 days.      Patient Instructions   Augmentin as prescribed  Increase fluids and rest  Can continue to use OTCs as needed for symptom reduction  Follow up if symptoms are not improving with treatment.   "

## 2024-09-19 NOTE — PATIENT INSTRUCTIONS
Augmentin as prescribed  Increase fluids and rest  Can continue to use OTCs as needed for symptom reduction  Follow up if symptoms are not improving with treatment.

## 2024-10-25 ENCOUNTER — PATIENT MESSAGE (OUTPATIENT)
Dept: PRIMARY CARE | Facility: CLINIC | Age: 69
End: 2024-10-25
Payer: COMMERCIAL

## 2024-11-11 PROBLEM — N40.0 BENIGN PROSTATIC HYPERPLASIA: Status: ACTIVE | Noted: 2024-11-11

## 2024-11-11 PROBLEM — L81.4 OTHER MELANIN HYPERPIGMENTATION: Status: ACTIVE | Noted: 2019-08-21

## 2024-11-11 PROBLEM — L82.1 OTHER SEBORRHEIC KERATOSIS: Status: ACTIVE | Noted: 2019-08-21

## 2024-11-11 PROBLEM — I10 HYPERTENSIVE DISORDER: Status: ACTIVE | Noted: 2024-11-11

## 2024-11-11 PROBLEM — Z85.46 HISTORY OF MALIGNANT NEOPLASM OF PROSTATE: Status: ACTIVE | Noted: 2024-11-11

## 2024-11-11 PROBLEM — D48.5 NEOPLASM OF UNCERTAIN BEHAVIOR OF SKIN: Status: ACTIVE | Noted: 2019-08-21

## 2024-11-11 PROBLEM — D22.5 MELANOCYTIC NEVI OF TRUNK: Status: ACTIVE | Noted: 2019-08-21

## 2024-11-11 PROBLEM — F41.0 ANXIETY ATTACK: Status: ACTIVE | Noted: 2024-11-11

## 2024-11-11 PROBLEM — L85.3 XEROSIS CUTIS: Status: ACTIVE | Noted: 2019-08-21

## 2024-11-11 PROBLEM — E78.5 DYSLIPIDEMIA: Status: ACTIVE | Noted: 2024-11-11

## 2024-11-11 PROBLEM — Z86.79 HISTORY OF HYPERTENSION: Status: ACTIVE | Noted: 2024-11-11

## 2024-11-11 PROBLEM — L90.5 SCAR CONDITION AND FIBROSIS OF SKIN: Status: ACTIVE | Noted: 2019-08-21

## 2024-11-11 PROBLEM — L57.9 SKIN CHANGES DUE TO CHRONIC EXPOSURE TO NONIONIZING RADIATION, UNSPECIFIED: Status: ACTIVE | Noted: 2019-08-21

## 2024-11-11 PROBLEM — D18.01 HEMANGIOMA OF SKIN AND SUBCUTANEOUS TISSUE: Status: ACTIVE | Noted: 2019-08-21

## 2024-11-11 PROBLEM — U07.1 DISEASE DUE TO SEVERE ACUTE RESPIRATORY SYNDROME CORONAVIRUS 2 (SARS-COV-2): Status: ACTIVE | Noted: 2024-11-11

## 2024-11-11 PROBLEM — L21.9 SEBORRHEIC DERMATITIS, UNSPECIFIED: Status: ACTIVE | Noted: 2019-08-21

## 2024-11-12 ENCOUNTER — APPOINTMENT (OUTPATIENT)
Dept: PRIMARY CARE | Facility: CLINIC | Age: 69
End: 2024-11-12
Payer: COMMERCIAL

## 2024-11-12 VITALS
SYSTOLIC BLOOD PRESSURE: 132 MMHG | HEART RATE: 74 BPM | HEIGHT: 67 IN | BODY MASS INDEX: 26.12 KG/M2 | DIASTOLIC BLOOD PRESSURE: 78 MMHG | TEMPERATURE: 97.5 F | OXYGEN SATURATION: 97 % | WEIGHT: 166.4 LBS

## 2024-11-12 DIAGNOSIS — M79.672 LEFT FOOT PAIN: ICD-10-CM

## 2024-11-12 DIAGNOSIS — E78.2 HYPERLIPEMIA, MIXED: ICD-10-CM

## 2024-11-12 DIAGNOSIS — R05.1 ACUTE COUGH: ICD-10-CM

## 2024-11-12 DIAGNOSIS — C61 ADENOCARCINOMA OF PROSTATE (MULTI): ICD-10-CM

## 2024-11-12 DIAGNOSIS — I10 BENIGN ESSENTIAL HYPERTENSION: Primary | ICD-10-CM

## 2024-11-12 PROBLEM — U07.1 DISEASE DUE TO SEVERE ACUTE RESPIRATORY SYNDROME CORONAVIRUS 2 (SARS-COV-2): Status: RESOLVED | Noted: 2024-11-11 | Resolved: 2024-11-12

## 2024-11-12 PROCEDURE — 99214 OFFICE O/P EST MOD 30 MIN: CPT | Performed by: FAMILY MEDICINE

## 2024-11-12 PROCEDURE — 3008F BODY MASS INDEX DOCD: CPT | Performed by: FAMILY MEDICINE

## 2024-11-12 PROCEDURE — 1159F MED LIST DOCD IN RCRD: CPT | Performed by: FAMILY MEDICINE

## 2024-11-12 PROCEDURE — 1126F AMNT PAIN NOTED NONE PRSNT: CPT | Performed by: FAMILY MEDICINE

## 2024-11-12 PROCEDURE — 1036F TOBACCO NON-USER: CPT | Performed by: FAMILY MEDICINE

## 2024-11-12 PROCEDURE — 1160F RVW MEDS BY RX/DR IN RCRD: CPT | Performed by: FAMILY MEDICINE

## 2024-11-12 PROCEDURE — 3078F DIAST BP <80 MM HG: CPT | Performed by: FAMILY MEDICINE

## 2024-11-12 PROCEDURE — 1123F ACP DISCUSS/DSCN MKR DOCD: CPT | Performed by: FAMILY MEDICINE

## 2024-11-12 PROCEDURE — 3075F SYST BP GE 130 - 139MM HG: CPT | Performed by: FAMILY MEDICINE

## 2024-11-12 RX ORDER — BENZONATATE 200 MG/1
200 CAPSULE ORAL 3 TIMES DAILY PRN
Qty: 42 CAPSULE | Refills: 0 | Status: SHIPPED | OUTPATIENT
Start: 2024-11-12 | End: 2024-12-12

## 2024-11-12 ASSESSMENT — PAIN SCALES - GENERAL: PAINLEVEL_OUTOF10: 0-NO PAIN

## 2024-11-12 ASSESSMENT — PATIENT HEALTH QUESTIONNAIRE - PHQ9
1. LITTLE INTEREST OR PLEASURE IN DOING THINGS: NOT AT ALL
2. FEELING DOWN, DEPRESSED OR HOPELESS: NOT AT ALL
SUM OF ALL RESPONSES TO PHQ9 QUESTIONS 1 AND 2: 0

## 2024-11-12 ASSESSMENT — ENCOUNTER SYMPTOMS
LOSS OF SENSATION IN FEET: 0
DEPRESSION: 0
OCCASIONAL FEELINGS OF UNSTEADINESS: 0

## 2024-11-12 NOTE — PROGRESS NOTES
"Subjective   Patient ID: Medardo Mcdonald is a 69 y.o. male who presents for Follow-up, Hypertension, and Anxiety.    HPI   HTN: presents for follow up, doing well with medication, no issues or problems    Had a URI in September and was seen but feels like cough never fully resolved.  Reports that also had a mild cold a couple weeks ago.  Cough is better, no SOB, no wheeze.  It is nonproductive.  Occurs more when he is talking a lot.    HLD: doing well with medication    Review of Systems  Negative unless noted in HPI    Objective   /78 (BP Location: Left arm, Patient Position: Sitting, BP Cuff Size: Large adult)   Pulse 74   Temp 36.4 °C (97.5 °F) (Temporal)   Ht 1.702 m (5' 7\")   Wt 75.5 kg (166 lb 6.4 oz)   SpO2 97%   BMI 26.06 kg/m²     Physical Exam  Constitutional:       Appearance: He is normal weight.   HENT:      Right Ear: Tympanic membrane, ear canal and external ear normal.      Left Ear: Tympanic membrane, ear canal and external ear normal.      Nose: Nose normal.      Mouth/Throat:      Mouth: Mucous membranes are moist.      Pharynx: Oropharynx is clear.   Eyes:      Extraocular Movements: Extraocular movements intact.      Conjunctiva/sclera: Conjunctivae normal.      Pupils: Pupils are equal, round, and reactive to light.   Cardiovascular:      Rate and Rhythm: Normal rate and regular rhythm.   Pulmonary:      Effort: Pulmonary effort is normal.      Breath sounds: Normal breath sounds.   Lymphadenopathy:      Cervical: No cervical adenopathy.   Neurological:      General: No focal deficit present.      Mental Status: He is alert and oriented to person, place, and time.   Psychiatric:         Mood and Affect: Mood normal.         Assessment/Plan   Problem List Items Addressed This Visit             ICD-10-CM    Benign essential hypertension - Primary I10     Well controlled  Due for lab work. Orders placed         Relevant Orders    Comprehensive Metabolic Panel    CBC    Acute cough R05.1 "     Normal exam, not on ACEI  Consistent with post viral cough  Recommend symptomatic treatment including increased hydration, regular nasal saline, use of analgesics such as acetaminophen/ibuprofen   Follow up if symptoms fail to improve  Add tessalon perles         Relevant Medications    benzonatate (Tessalon) 200 mg capsule     Other Visit Diagnoses         Codes    Hyperlipemia, mixed     E78.2    Relevant Orders    Lipid Panel    CBC    Adenocarcinoma of prostate (Multi)     C61    Relevant Orders    CBC    Prostate Specific Antigen    Left foot pain     M79.672    Relevant Orders    Uric Acid

## 2024-11-13 NOTE — ASSESSMENT & PLAN NOTE
Normal exam, not on ACEI  Consistent with post viral cough  Recommend symptomatic treatment including increased hydration, regular nasal saline, use of analgesics such as acetaminophen/ibuprofen   Follow up if symptoms fail to improve  Add tessalon perles

## 2024-11-14 ENCOUNTER — LAB (OUTPATIENT)
Dept: LAB | Facility: LAB | Age: 69
End: 2024-11-14
Payer: COMMERCIAL

## 2024-11-14 DIAGNOSIS — C61 ADENOCARCINOMA OF PROSTATE (MULTI): ICD-10-CM

## 2024-11-14 DIAGNOSIS — I10 BENIGN ESSENTIAL HYPERTENSION: ICD-10-CM

## 2024-11-14 DIAGNOSIS — R73.01 ELEVATED FASTING BLOOD SUGAR: Primary | ICD-10-CM

## 2024-11-14 DIAGNOSIS — M79.672 LEFT FOOT PAIN: ICD-10-CM

## 2024-11-14 DIAGNOSIS — E78.2 HYPERLIPEMIA, MIXED: ICD-10-CM

## 2024-11-14 DIAGNOSIS — R73.01 ELEVATED FASTING BLOOD SUGAR: ICD-10-CM

## 2024-11-14 LAB
ALBUMIN SERPL BCP-MCNC: 4.2 G/DL (ref 3.4–5)
ALP SERPL-CCNC: 66 U/L (ref 33–136)
ALT SERPL W P-5'-P-CCNC: 22 U/L (ref 10–52)
ANION GAP SERPL CALC-SCNC: 15 MMOL/L (ref 10–20)
AST SERPL W P-5'-P-CCNC: 16 U/L (ref 9–39)
BILIRUB SERPL-MCNC: 0.6 MG/DL (ref 0–1.2)
BUN SERPL-MCNC: 19 MG/DL (ref 6–23)
CALCIUM SERPL-MCNC: 9 MG/DL (ref 8.6–10.6)
CHLORIDE SERPL-SCNC: 103 MMOL/L (ref 98–107)
CHOLEST SERPL-MCNC: 121 MG/DL (ref 0–199)
CHOLESTEROL/HDL RATIO: 2.6
CO2 SERPL-SCNC: 26 MMOL/L (ref 21–32)
CREAT SERPL-MCNC: 0.99 MG/DL (ref 0.5–1.3)
EGFRCR SERPLBLD CKD-EPI 2021: 83 ML/MIN/1.73M*2
ERYTHROCYTE [DISTWIDTH] IN BLOOD BY AUTOMATED COUNT: 11.5 % (ref 11.5–14.5)
EST. AVERAGE GLUCOSE BLD GHB EST-MCNC: 123 MG/DL
GLUCOSE SERPL-MCNC: 117 MG/DL (ref 74–99)
HBA1C MFR BLD: 5.9 %
HCT VFR BLD AUTO: 42.5 % (ref 41–52)
HDLC SERPL-MCNC: 46.5 MG/DL
HGB BLD-MCNC: 14.7 G/DL (ref 13.5–17.5)
LDLC SERPL CALC-MCNC: 41 MG/DL
MCH RBC QN AUTO: 30.8 PG (ref 26–34)
MCHC RBC AUTO-ENTMCNC: 34.6 G/DL (ref 32–36)
MCV RBC AUTO: 89 FL (ref 80–100)
NON HDL CHOLESTEROL: 75 MG/DL (ref 0–149)
NRBC BLD-RTO: 0 /100 WBCS (ref 0–0)
PLATELET # BLD AUTO: 323 X10*3/UL (ref 150–450)
POTASSIUM SERPL-SCNC: 3.9 MMOL/L (ref 3.5–5.3)
PROT SERPL-MCNC: 6.5 G/DL (ref 6.4–8.2)
PSA SERPL-MCNC: 4.85 NG/ML
RBC # BLD AUTO: 4.77 X10*6/UL (ref 4.5–5.9)
SODIUM SERPL-SCNC: 140 MMOL/L (ref 136–145)
TRIGL SERPL-MCNC: 167 MG/DL (ref 0–149)
URATE SERPL-MCNC: 4.9 MG/DL (ref 4–7.5)
VLDL: 33 MG/DL (ref 0–40)
WBC # BLD AUTO: 7.9 X10*3/UL (ref 4.4–11.3)

## 2024-11-14 PROCEDURE — 85027 COMPLETE CBC AUTOMATED: CPT

## 2024-11-14 PROCEDURE — 80053 COMPREHEN METABOLIC PANEL: CPT

## 2024-11-14 PROCEDURE — 83036 HEMOGLOBIN GLYCOSYLATED A1C: CPT

## 2024-11-14 PROCEDURE — 84153 ASSAY OF PSA TOTAL: CPT

## 2024-11-14 PROCEDURE — 80061 LIPID PANEL: CPT

## 2024-11-14 PROCEDURE — 84550 ASSAY OF BLOOD/URIC ACID: CPT

## 2024-11-14 PROCEDURE — 36415 COLL VENOUS BLD VENIPUNCTURE: CPT

## 2024-12-01 DIAGNOSIS — I10 HTN (HYPERTENSION), BENIGN: ICD-10-CM

## 2024-12-03 RX ORDER — METOPROLOL SUCCINATE 50 MG/1
50 TABLET, EXTENDED RELEASE ORAL DAILY
Qty: 90 TABLET | Refills: 3 | Status: SHIPPED | OUTPATIENT
Start: 2024-12-03

## 2024-12-03 RX ORDER — AMLODIPINE BESYLATE 5 MG/1
5 TABLET ORAL DAILY
Qty: 90 TABLET | Refills: 3 | Status: SHIPPED | OUTPATIENT
Start: 2024-12-03

## 2025-01-20 ENCOUNTER — LAB (OUTPATIENT)
Dept: LAB | Facility: LAB | Age: 70
End: 2025-01-20
Payer: COMMERCIAL

## 2025-01-20 DIAGNOSIS — C61 MALIGNANT NEOPLASM OF PROSTATE (MULTI): Primary | ICD-10-CM

## 2025-01-20 PROCEDURE — 84153 ASSAY OF PSA TOTAL: CPT

## 2025-01-21 LAB — PSA SERPL-MCNC: 4.69 NG/ML

## 2025-02-17 ENCOUNTER — OFFICE VISIT (OUTPATIENT)
Dept: ORTHOPEDIC SURGERY | Facility: HOSPITAL | Age: 70
End: 2025-02-17
Payer: COMMERCIAL

## 2025-02-17 VITALS — WEIGHT: 165 LBS | HEIGHT: 67 IN | BODY MASS INDEX: 25.9 KG/M2

## 2025-02-17 DIAGNOSIS — S76.111A QUADRICEPS STRAIN, RIGHT, INITIAL ENCOUNTER: Primary | ICD-10-CM

## 2025-02-17 PROCEDURE — 3008F BODY MASS INDEX DOCD: CPT | Performed by: FAMILY MEDICINE

## 2025-02-17 PROCEDURE — 1159F MED LIST DOCD IN RCRD: CPT | Performed by: FAMILY MEDICINE

## 2025-02-17 PROCEDURE — 1125F AMNT PAIN NOTED PAIN PRSNT: CPT | Performed by: FAMILY MEDICINE

## 2025-02-17 PROCEDURE — 99203 OFFICE O/P NEW LOW 30 MIN: CPT | Performed by: FAMILY MEDICINE

## 2025-02-17 PROCEDURE — 99213 OFFICE O/P EST LOW 20 MIN: CPT | Performed by: FAMILY MEDICINE

## 2025-02-17 PROCEDURE — 1036F TOBACCO NON-USER: CPT | Performed by: FAMILY MEDICINE

## 2025-02-17 PROCEDURE — 1123F ACP DISCUSS/DSCN MKR DOCD: CPT | Performed by: FAMILY MEDICINE

## 2025-02-17 ASSESSMENT — PAIN - FUNCTIONAL ASSESSMENT: PAIN_FUNCTIONAL_ASSESSMENT: 0-10

## 2025-02-17 ASSESSMENT — PAIN SCALES - GENERAL: PAINLEVEL_OUTOF10: 1

## 2025-02-17 NOTE — PROGRESS NOTES
Orthopedic Injury Clinic Note  Date: 02/17/2025    Assessment & Plan:    Dx: Right quadriceps strain    Intact extensor mechanism makes quad tendon tear very unlikely.     Discussed options for management of this condition and made shared decision making for the selected treatment listed below:  - Today's treatment plan: Discussed exercises/stretches he can perform at home.  - Work/exercise limits: No specific restrictions. All activities as tolerated.   - Follow-up: As needed if not improving over the next 3 to 4 weeks  All questions answered and patient is agreeable to plan of care.    Chief complaint: Right thigh pain      HPI:  69-year-old male seen in injury clinic for injury to his right quad that occurred on 2/14 when he was playing [employer] rates and felt a sudden pop in his mid anterior right thigh.  Originally had some discomfort felt like there is a divot and some swelling.  Currently having no pain, no swelling, bruising or divot noticed.  Went to the ER with x-rays obtained.  Was given crutches and a knee immobilizer.  No symptoms at this time.    Patient Active Problem List   Diagnosis    Benign essential hypertension    Malignant tumor of prostate (Multi)    Generalized anxiety disorder    Mixed hyperlipidemia    Insomnia    Acute cough    Anxiety attack    Benign prostatic hyperplasia    Dyslipidemia    Hemangioma of skin and subcutaneous tissue    History of hypertension    History of malignant neoplasm of prostate    Hypertensive disorder    Melanocytic nevi of trunk    Neoplasm of uncertain behavior of skin    Other melanin hyperpigmentation    Other seborrheic keratosis    Scar condition and fibrosis of skin    Seborrheic dermatitis, unspecified    Skin changes due to chronic exposure to nonionizing radiation, unspecified    Xerosis cutis     Past Surgical History:   Procedure Laterality Date    OTHER SURGICAL HISTORY  01/16/2020    Prostate biopsy     Current Outpatient Medications on File Prior to  Visit   Medication Sig Dispense Refill    amLODIPine (Norvasc) 5 mg tablet TAKE 1 TABLET DAILY 90 tablet 3    escitalopram (Lexapro) 10 mg tablet TAKE 1 TABLET DAILY 90 tablet 3    losartan (Cozaar) 50 mg tablet TAKE 1 TABLET DAILY 90 tablet 3    metoprolol succinate XL (Toprol-XL) 50 mg 24 hr tablet TAKE 1 TABLET DAILY 90 tablet 3    simvastatin (Zocor) 40 mg tablet Take 1 tablet (40 mg) by mouth once daily at bedtime. 90 tablet 3     No current facility-administered medications on file prior to visit.       Exam:  General Exam:  Constitutional - NAD, AAO x 3, conversing appropriately.  HEENT- Normocephalic and atraumatic. No facial deformities. Hearing grossly normal.  Lungs - Breathing non-labored with normal rate. No accessory muscle use.  CV - Extremities warm and well-perfused, brisk capillary refill present.   Neuro - CN II-XII grossly intact.    Right Leg Exam:  Normal gait  No warmth, erythema or ecchymosis overlying  No palpable gap within quad tendon  Extensor mechanism intact, 5/5 strength knee extension  Full ROM all planes  Mild TTP mid rectus femoris with no palpable deformity or gap  NTTP over proximal/distal quadriceps, quad tendon, patella, MCL, LCL, ITB  [5]/5 strength of knee flexion/extension with no pain    Results:  Unable to review image at todays visit, performed at [facility]. Reported as no acute fracture with normal alignment.     ** Please excuse any errors in grammar or translation related to this dictation. Voice recognition software was utilized to prepare this document. **    Jaxon Waterman MD  Sports Medicine Fellow ([facility])

## 2025-03-27 ENCOUNTER — HOSPITAL ENCOUNTER (OUTPATIENT)
Dept: RADIOLOGY | Facility: HOSPITAL | Age: 70
Discharge: HOME | End: 2025-03-27
Payer: COMMERCIAL

## 2025-03-27 VITALS — BODY MASS INDEX: 25.83 KG/M2 | WEIGHT: 164.9 LBS

## 2025-03-27 DIAGNOSIS — C61 MALIGNANT NEOPLASM OF PROSTATE (MULTI): ICD-10-CM

## 2025-03-27 PROCEDURE — 72197 MRI PELVIS W/O & W/DYE: CPT

## 2025-03-27 PROCEDURE — 2550000001 HC RX 255 CONTRASTS: Performed by: UROLOGY

## 2025-03-27 PROCEDURE — A9575 INJ GADOTERATE MEGLUMI 0.1ML: HCPCS | Performed by: UROLOGY

## 2025-03-27 RX ORDER — GADOTERATE MEGLUMINE 376.9 MG/ML
15 INJECTION INTRAVENOUS
Status: COMPLETED | OUTPATIENT
Start: 2025-03-27 | End: 2025-03-27

## 2025-03-27 RX ADMIN — GADOTERATE MEGLUMINE 15 ML: 376.9 INJECTION INTRAVENOUS at 10:52

## 2025-04-24 DIAGNOSIS — C61 MALIGNANT NEOPLASM OF PROSTATE (MULTI): Primary | ICD-10-CM

## 2025-04-26 DIAGNOSIS — E78.5 HYPERLIPIDEMIA, UNSPECIFIED HYPERLIPIDEMIA TYPE: ICD-10-CM

## 2025-04-28 RX ORDER — SIMVASTATIN 40 MG/1
40 TABLET, FILM COATED ORAL NIGHTLY
Qty: 90 TABLET | Refills: 3 | Status: SHIPPED | OUTPATIENT
Start: 2025-04-28

## 2025-05-12 ENCOUNTER — PRE-ADMISSION TESTING (OUTPATIENT)
Dept: PREADMISSION TESTING | Facility: HOSPITAL | Age: 70
End: 2025-05-12
Payer: COMMERCIAL

## 2025-05-12 ENCOUNTER — LAB (OUTPATIENT)
Dept: LAB | Facility: HOSPITAL | Age: 70
End: 2025-05-12
Payer: COMMERCIAL

## 2025-05-12 VITALS
BODY MASS INDEX: 26.37 KG/M2 | WEIGHT: 168 LBS | OXYGEN SATURATION: 97 % | HEART RATE: 67 BPM | RESPIRATION RATE: 16 BRPM | SYSTOLIC BLOOD PRESSURE: 144 MMHG | DIASTOLIC BLOOD PRESSURE: 71 MMHG | TEMPERATURE: 98.1 F | HEIGHT: 67 IN

## 2025-05-12 DIAGNOSIS — Z01.818 ENCOUNTER FOR OTHER PREPROCEDURAL EXAMINATION: Primary | ICD-10-CM

## 2025-05-12 DIAGNOSIS — Z01.818 PREOP TESTING: Primary | ICD-10-CM

## 2025-05-12 LAB
ALBUMIN SERPL BCP-MCNC: 4.5 G/DL (ref 3.4–5)
ALP SERPL-CCNC: 62 U/L (ref 33–136)
ALT SERPL W P-5'-P-CCNC: 17 U/L (ref 10–52)
ANION GAP SERPL CALC-SCNC: 10 MMOL/L (ref 10–20)
APPEARANCE UR: CLEAR
AST SERPL W P-5'-P-CCNC: 14 U/L (ref 9–39)
BASOPHILS # BLD AUTO: 0.02 X10*3/UL (ref 0–0.1)
BASOPHILS NFR BLD AUTO: 0.4 %
BILIRUB SERPL-MCNC: 0.5 MG/DL (ref 0–1.2)
BILIRUB UR STRIP.AUTO-MCNC: NEGATIVE MG/DL
BUN SERPL-MCNC: 19 MG/DL (ref 6–23)
CALCIUM SERPL-MCNC: 9.4 MG/DL (ref 8.6–10.3)
CHLORIDE SERPL-SCNC: 105 MMOL/L (ref 98–107)
CO2 SERPL-SCNC: 30 MMOL/L (ref 21–32)
COLOR UR: YELLOW
CREAT SERPL-MCNC: 0.87 MG/DL (ref 0.5–1.3)
EGFRCR SERPLBLD CKD-EPI 2021: >90 ML/MIN/1.73M*2
EOSINOPHIL # BLD AUTO: 0.06 X10*3/UL (ref 0–0.7)
EOSINOPHIL NFR BLD AUTO: 1.1 %
ERYTHROCYTE [DISTWIDTH] IN BLOOD BY AUTOMATED COUNT: 11.7 % (ref 11.5–14.5)
GLUCOSE SERPL-MCNC: 70 MG/DL (ref 74–99)
GLUCOSE UR STRIP.AUTO-MCNC: NEGATIVE MG/DL
HCT VFR BLD AUTO: 42.5 % (ref 41–52)
HGB BLD-MCNC: 14.2 G/DL (ref 13.5–17.5)
HOLD SPECIMEN: NORMAL
IMM GRANULOCYTES # BLD AUTO: 0.02 X10*3/UL (ref 0–0.7)
IMM GRANULOCYTES NFR BLD AUTO: 0.4 % (ref 0–0.9)
KETONES UR STRIP.AUTO-MCNC: NEGATIVE MG/DL
LEUKOCYTE ESTERASE UR QL STRIP.AUTO: NEGATIVE
LYMPHOCYTES # BLD AUTO: 1.19 X10*3/UL (ref 1.2–4.8)
LYMPHOCYTES NFR BLD AUTO: 21 %
MCH RBC QN AUTO: 29.9 PG (ref 26–34)
MCHC RBC AUTO-ENTMCNC: 33.4 G/DL (ref 32–36)
MCV RBC AUTO: 90 FL (ref 80–100)
MONOCYTES # BLD AUTO: 0.57 X10*3/UL (ref 0.1–1)
MONOCYTES NFR BLD AUTO: 10 %
NEUTROPHILS # BLD AUTO: 3.82 X10*3/UL (ref 1.2–7.7)
NEUTROPHILS NFR BLD AUTO: 67.1 %
NITRITE UR QL STRIP.AUTO: NEGATIVE
NRBC BLD-RTO: ABNORMAL /100{WBCS}
PH UR STRIP.AUTO: 6 [PH]
PLATELET # BLD AUTO: 301 X10*3/UL (ref 150–450)
POTASSIUM SERPL-SCNC: 4.3 MMOL/L (ref 3.5–5.3)
PROT SERPL-MCNC: 6.6 G/DL (ref 6.4–8.2)
PROT UR STRIP.AUTO-MCNC: NEGATIVE MG/DL
RBC # BLD AUTO: 4.75 X10*6/UL (ref 4.5–5.9)
RBC # UR STRIP.AUTO: NEGATIVE MG/DL
SODIUM SERPL-SCNC: 141 MMOL/L (ref 136–145)
SP GR UR STRIP.AUTO: 1.02
UROBILINOGEN UR STRIP.AUTO-MCNC: 0.2 MG/DL
WBC # BLD AUTO: 5.7 X10*3/UL (ref 4.4–11.3)

## 2025-05-12 PROCEDURE — 99204 OFFICE O/P NEW MOD 45 MIN: CPT | Performed by: NURSE PRACTITIONER

## 2025-05-12 PROCEDURE — 93010 ELECTROCARDIOGRAM REPORT: CPT | Performed by: INTERNAL MEDICINE

## 2025-05-12 PROCEDURE — 81003 URINALYSIS AUTO W/O SCOPE: CPT

## 2025-05-12 PROCEDURE — 93005 ELECTROCARDIOGRAM TRACING: CPT

## 2025-05-12 PROCEDURE — 80053 COMPREHEN METABOLIC PANEL: CPT

## 2025-05-12 PROCEDURE — 85025 COMPLETE CBC W/AUTO DIFF WBC: CPT

## 2025-05-12 NOTE — PREPROCEDURE INSTRUCTIONS
Medication List            Accurate as of May 12, 2025  8:30 AM. Always use your most recent med list.                amLODIPine 5 mg tablet  Commonly known as: Norvasc  TAKE 1 TABLET DAILY  Medication Adjustments for Surgery: Take/Use as prescribed     escitalopram 10 mg tablet  Commonly known as: Lexapro  TAKE 1 TABLET DAILY  Medication Adjustments for Surgery: Take/Use as prescribed     losartan 50 mg tablet  Commonly known as: Cozaar  TAKE 1 TABLET DAILY  Medication Adjustments for Surgery: Take last dose 1 day (24 hours) before surgery  Notes to patient: last dose preoperatively on if taken in the mornings on 5/14/25 if taken in the evenings on 5/13/25     metoprolol succinate XL 50 mg 24 hr tablet  Commonly known as: Toprol-XL  TAKE 1 TABLET DAILY  Medication Adjustments for Surgery: Take/Use as prescribed     simvastatin 40 mg tablet  Commonly known as: Zocor  TAKE 1 TABLET DAILY AT BEDTIME  Medication Adjustments for Surgery: Take/Use as prescribed            NPO Instructions:     Do not eat any food after midnight the night before your surgery/procedure.  You may have clear liquids until TWO hours before surgery/procedure. This includes water, black tea/coffee, (no milk or cream) apple juice and electrolyte drinks (Gatorade).  You may chew gum up to TWO hours before your surgery/procedure.     Additional Instructions:      Seven/Six Days before Surgery:  Review your medication instructions, stop indicated medications  Five Days before Surgery:  Review your medication instructions, stop indicated medications  Review your medication instructions, stop indicated medications  The Day before Surgery:  No smoking or alcohol use 24 hours before surgery  Review your medication instructions, stop indicated medications  You will be contacted regarding the time of your arrival to facility and surgery time  Do not eat any food after Midnight  Day of Surgery:  Review your medication instructions, take indicated  medications  If you have diabetes, please check your fasting blood sugar upon awakening.  If fasting blood sugar is <80 mg/dl, drink 100 ml of apple juice, time limit of 2 hours before  You may have clear liquids until TWO hours before surgery/procedure.  This includes water, black tea/coffee, (no milk or cream) apple juice and electrolyte drinks (Gatorade)  You may chew gum up to TWO hours before your surgery/procedure  Wear  comfortable loose fitting clothing  Do not use moisturizers, creams, lotions or perfume  All jewelry and valuables should be left at home     CONTACT SURGEON'S OFFICE IF YOU DEVELOP:  * Fever = 100.4 F   * New respiratory symptoms (e.g. cough, shortness of breath, respiratory distress, sore throat)  * Recent loss of taste or smell  *Flu like symptoms such as headache, fatigue or gastrointestinal symptoms  * You develop any open sores, shingles, burning or painful urination   AND/OR:  * You no longer wish to have the surgery.  * Any other personal circumstances change that may lead to the need to cancel or defer this surgery.  *You were admitted to any hospital within one week of your planned procedure.     SMOKING:  *Quitting smoking can make a huge difference to your health and recovery from surgery.    *If you need help with quitting, call 2-924-QUIT-NOW.     THE DAY BEFORE SURGERY:  *Do not eat any food after midnight the night before your surgery.   *You may have up to TEN OUNCES of clear liquids until TWO hours before your instructed ARRIVAL TIME to hospital. This includes water, black tea/coffee, (no milk or cream) apple juice, clear broth and electrolyte drinks (Gatorade). Please avoid clear liquids that are red in color.   *You may chew gum/mints up to TWO hours before your surgery/procedure.     SURGICAL TIME:  *You will be contacted between 2 p.m. and 3 p.m. the business day before your surgery with your arrival time.  *If you haven't received a call by 3pm, call [phone]  [phone]  *Scheduled surgery times may change and you will be notified if this occurs-check your personal voicemail for any updates.     ON THE MORNING OF SURGERY:  *Wear comfortable, loose fitting clothing.   *Do not use moisturizers, creams, lotions or perfume.  *All jewelry and valuables should be left at home.  *Prosthetic devices such as contact lenses, hearing aids, dentures, eyelash extensions, hairpins and body piercing must be removed before surgery.     BRING WITH YOU:  *Photo ID and insurance card  *Current list of medications and allergies  *Pacemaker/Defibrillator/Heart stent cards  *CPAP machine and mask  *Slings/splints/crutches  *Copy of your complete Advanced Directive/DHPOA-if applicable  *Neurostimulator implant remote     PARKING AND ARRIVAL:  *Check in at the Main Entrance desk and let them know you are here for surgery.     IF YOU ARE HAVING OUTPATIENT/SAME DAY SURGERY:  *A responsible adult MUST accompany you at the time of discharge and stay with you for 24 hours after your surgery.  *You may NOT drive yourself home after surgery.  *You may use a taxi or ride sharing service ([employer], [employer]) to return home ONLY if you are accompanied by a friend or family member.  *Instructions for resuming your medications will be provided by your surgeon.     Thank you for coming to Pre Admission testing.      If I have prescribed medication please don't forget to  at your pharmacy.      Any questions about today's visit call [phone] and leave a message in the general mailbox.     Patient instructed to ambulate as soon as possible postoperatively to decrease thromboembolic risk.       Thank you for visiting [facility].  If you have any changes to your health condition, please call the surgeons office to alert them and give them details of your symptoms.        Preoperative Fasting Guidelines     Why must I stop eating and drinking near surgery time?  With sedation, food or  liquid in your stomach can enter your lungs causing serious complications  Increases nausea and vomiting     When do I need to stop eating and drinking before my surgery?  Do not eat any food after midnight the night before your surgery/procedure.  You may have up to TEN ounces of clear liquid until TWO hours before your instructed arrival time to the hospital.  This includes water, black tea/coffee, (no milk or cream) apple juice, and electrolyte drinks (Gatorade)  You may chew gum until TWO hours before your surgery/procedure        Additional Instructions:      The Day before Surgery:  -Review your medication instructions, stop indicated medications  -You will be contacted in the evening regarding the time of your arrival to facility and surgery time     Day of Surgery:  -Review your medication instructions, take indicated medications  -Wear comfortable loose fitting clothing  -Do not use moisturizers, creams, lotions or perfume  -All jewelry and valuables should be left at home                   Preoperative Brain Exercises     What are brain exercises?  A brain exercise is any activity that engages your thinking (cognitive) skills.     What types of activities are considered brain exercises?  Jigsaw puzzles, crossword puzzles, word jumble, memory games, word search, and many more.  Many can be found free online or on your phone via a mobile jose [email or web address].     Why should I do brain exercises before my surgery?  More recent research has shown brain exercise before surgery can lower the risk of postoperative delirium (confusion) which can be especially important for older adults.  Patients who did brain exercises for 5 to 10 minutes/day in the days before surgery, cut their risk of postoperative delirium in half up to 1 week after surgery.                         [facility]     Preoperative Deep Breathing Exercises     Why it is important to do deep breathing exercises before my surgery?  Deep  breathing exercises strengthen your breathing muscles.  This helps you to recover after your surgery and decreases the chance of breathing complications.        How are the deep breathing exercises done?  Sit straight with your back supported.  Breathe in deeply and slowly through your nose. Your lower rib cage should expand and your abdomen may move forward.  Hold that breath for 3 to 5 seconds.  Breathe out through pursed lips, slowly and completely.  Rest and repeat 10 times every hour while awake.  Rest longer if you become dizzy or lightheaded.                      [facility]     Preoperative Deep Breathing Exercises     Why it is important to do deep breathing exercises before my surgery?  Deep breathing exercises strengthen your breathing muscles.  This helps you to recover after your surgery and decreases the chance of breathing complications.        How are the deep breathing exercises done?  Sit straight with your back supported.  Breathe in deeply and slowly through your nose. Your lower rib cage should expand and your abdomen may move forward.  Hold that breath for 3 to 5 seconds.  Breathe out through pursed lips, slowly and completely.  Rest and repeat 10 times every hour while awake.  Rest longer if you become dizzy or lightheaded.        Patient Information: Incentive Spirometer  What is an incentive spirometer?  An incentive spirometer is a device used before and after surgery to “exercise” your lungs.  It helps you to take deeper breaths to expand your lungs.  Below is an example of a basic incentive spirometer.  The device you receive may differ slightly but they all function the same.    Why do I need to use an incentive spirometer?  Using your incentive spirometer prepares your lungs for surgery and helps prevent lung problems after surgery.  How do I use my incentive spirometer?  When you're using your incentive spirometer, make sure to breathe through your mouth. If  you breathe through your nose, the incentive spirometer won't work properly. You can hold your nose if you have trouble.  If you feel dizzy at any time, stop and rest. Try again at a later time.  Follow the steps below:  Set up your incentive spirometer, expand the flexible tubing and connect to the outlet.  Sit upright in a chair or bed. Hold the incentive spirometer at eye level.   Put the mouthpiece in your mouth and close your lips tightly around it. Slowly breathe out (exhale) completely.  Breathe in (inhale) slowly through your mouth as deeply as you can. As you take a breath, you will see the piston rise inside the large column. While the piston rises, the indicator should move upwards. It should stay in between the 2 arrows (see Figure).  Try to get the piston as high as you can, while keeping the indicator between the arrows.   If the indicator doesn't stay between the arrows, you're breathing either too fast or too slow.  When you get it as high as you can, hold your breath for 10 seconds, or as long as possible. While you're holding your breath, the piston will slowly fall to the base of the spirometer.  Once the piston reaches the bottom of the spirometer, breathe out slowly through your mouth. Rest for a few seconds.  Repeat 10 times. Try to get the piston to the same level with each breath.  Repeat every hour while awake  You can carefully clean the outside of the mouthpiece with an alcohol wipe or soap and water.       Patient and Family Education             Ways You Can Help Prevent Blood Clots                    This handout explains some simple things you can do to help prevent blood clots.      Blood clots are blockages that can form in the body's veins. When a blood clot forms in your deep veins, it may be called a deep vein thrombosis, or DVT for short. Blood clots can happen in any part of the body where blood flows, but they are most common in the arms and legs. If a piece of a blood clot  breaks free and travels to the lungs, it is called a pulmonary embolus (PE). A PE can be a very serious problem.         Being in the hospital or having surgery can raise your chances of getting a blood clot because you may not be well enough to move around as much as you normally do.         Ways you can help prevent blood clots in the hospital           Wearing SCDs. SCDs stands for Sequential Compression Devices.   SCDs are special sleeves that wrap around your legs  They attach to a pump that fills them with air to gently squeeze your legs every few minutes.   This helps return the blood in your legs to your heart.   SCDs should only be taken off when walking or bathing.   SCDs may not be comfortable, but they can help save your life.                                            Wearing compression stockings - if your doctor orders them. These special snug fitting stockings gently squeeze your legs to help blood flow.       Walking. Walking helps move the blood in your legs.   If your doctor says it is ok, try walking the halls at least   5 times a day. Ask us to help you get up, so you don't fall.      Taking any blood thinning medicines your doctor orders.        Page 1 of 2            [facility]; 3/23   Ways you can help prevent blood clots at home         Wearing compression stockings - if your doctor orders them. ? Walking - to help move the blood in your legs.       Taking any blood thinning medicines your doctor orders.      Signs of a blood clot or PE        Tell your doctor or nurse know right away if you have of the problems listed below.    If you are at home, seek medical care right away. Call 911 for chest pain or problems breathing.                Signs of a blood clot (DVT) - such as pain,  swelling, redness or warmth in your arm or leg      Signs of a pulmonary embolism (PE) - such as chest pain or feeling short of breath

## 2025-05-12 NOTE — H&P (VIEW-ONLY)
[facility]/PAT Evaluation       Name: Medardo Mcdonald (Medardo Mcdonald)  /Age: 1955/69 y.o.     In-Person       Chief Complaint: Malignant neoplasm of prostate     HPI  Patient is an alert and oriented 69 year old male scheduled for a BIOPSY, PROSTATE, WITH IMAGING GUIDANCE on 5/15/25 with Dr. Mendez for  Malignant neoplasm of prostate . PMHX includes HTN, anxiety . Presents to [facility] today for perioperative risk stratification and optimization.     Medical History[1]    Surgical History[2]    Patient  has no history on file for sexual activity.    Family History[3]    Allergies[4]    Prior to Admission medications    Medication Sig Start Date End Date Taking? Authorizing Provider   amLODIPine (Norvasc) 5 mg tablet TAKE 1 TABLET DAILY 12/3/24   Milly Alfredo MD   escitalopram (Lexapro) 10 mg tablet TAKE 1 TABLET DAILY 24   Milly Alfredo MD   losartan (Cozaar) 50 mg tablet TAKE 1 TABLET DAILY 24   Milly Alfredo MD   metoprolol succinate XL (Toprol-XL) 50 mg 24 hr tablet TAKE 1 TABLET DAILY 12/3/24   Milly Alfredo MD   simvastatin (Zocor) 40 mg tablet TAKE 1 TABLET DAILY AT BEDTIME 25   Milly Alfredo MD         Review of Systems   Constitutional: Negative for chills, decreased appetite, diaphoresis, fever and malaise/fatigue.   Eyes:  Negative for blurred vision and double vision.   Cardiovascular:  Negative for chest pain, claudication, cyanosis, dyspnea on exertion, irregular heartbeat, leg swelling, near-syncope and palpitations.   Respiratory:  Negative for cough, hemoptysis, shortness of breath and wheezing.    Endocrine: Negative for cold intolerance, heat intolerance, polydipsia, polyphagia and polyuria.   Gastrointestinal:  Negative for abdominal pain, constipation, diarrhea, dysphagia, nausea and vomiting.   Genitourinary:  Negative for bladder incontinence, dysuria, hematuria, incomplete emptying, nocturia, frequency, pelvic pain and urgency.   Neurological:  Negative for headaches, light-headedness,  paresthesias, sensory change and weakness.   Psychiatric/Behavioral:  Negative for altered mental status.    Musculoskeletal: Negative for myalgias, arthralgias     Vitals and nursing note reviewed.     Physical exam  Constitutional:       Appearance: Normal appearance. He is Overweight.   HENT:      Head: Normocephalic and atraumatic.      Mouth/Throat:      Mouth: Mucous membranes are moist.      Pharynx: Oropharynx is clear.   Eyes:      Extraocular Movements: Extraocular movements intact.      Conjunctiva/sclera: Conjunctivae normal.      Pupils: Pupils are equal, round, and reactive to light.   Cardiovascular:      PMI at left midclavicular line. Normal rate. Regular rhythm. Normal S1. Normal S2.       Murmurs: There is no murmur.      No gallop.  No click. No rub.       No audible carotid bruit     No lower extremity edema on exam  Pulmonary:      Effort: Pulmonary effort is normal.      Breath sounds: Normal breath sounds.   Abdominal:      General: Abdomen is flat. Bowel sounds are normal.      Palpations: Abdomen is soft and non-tender  Musculoskeletal:      Cervical back: Normal range of motion and neck supple.   Skin:     General: Skin is warm and dry.      Capillary Refill: Capillary refill takes less than 2 seconds.   Neurological:      General: No focal deficit present.      Mental Status: He is alert and oriented to person, place, and time. Mental status is at baseline.   Psychiatric:         Mood and Affect: Mood normal.         Behavior: Behavior normal.         Thought Content: Thought content normal.         Judgment: Judgment normal.     Vitals and nursing note reviewed. Physical exam within normal limits.       DASI Risk Score      Flowsheet Row Questionnaire Series Submission from 5/5/2025 in [facility] [place] with Generic Provider Jazmin   Can you take care of yourself (eat, dress, bathe, or use toilet)?  2.75  filed at 05/05/2025 8820   Can you walk indoors, such as around your  house? 1.75  filed at 05/05/2025 2140   Can you walk a block or two on level ground?  2.75  filed at 05/05/2025 2140   Can you climb a flight of stairs or walk up a hill? 5.5  filed at 05/05/2025 2140   Can you run a short distance? 8  filed at 05/05/2025 2140   Can you do light work around the house like dusting or washing dishes? 2.7  filed at 05/05/2025 2140   Can you do moderate work around the house like vacuuming, sweeping floors or carrying groceries? 3.5  filed at 05/05/2025 2140   Can you do heavy work around the house like scrubbing floors or lifting and moving heavy furniture?  8  filed at 05/05/2025 2140   Can you do yard work like raking leaves, weeding or pushing a mower? 4.5  filed at 05/05/2025 2140   Can you have sexual relations? 5.25  filed at 05/05/2025 2140   Can you participate in moderate recreational activities like golf, bowling, dancing, doubles tennis or throwing a baseball or football? 6  filed at 05/05/2025 2140   Can you participate in strenous sports like swimming, singles tennis, football, basketball, or skiing? 7.5  filed at 05/05/2025 2140   DASI SCORE 58.2  filed at 05/05/2025 2140   METS Score (Will be calculated only when all the questions are answered) 9.9  filed at 05/05/2025 2140          Caprini DVT Assessment      Flowsheet Row Pre-Admission Testing from 5/12/2025 in [facility]   DVT Score (IF A SCORE IS NOT CALCULATING, MUST SELECT A BMI TO COMPLETE) 7 filed at 05/12/2025 0851   Medical Factors Present cancer, chemotherapy, or previous malignancy filed at 05/12/2025 0851   Surgical Factors Major surgery planned, including arthroscopic and laproscopic (1-2 hours) filed at 05/12/2025 0851   BMI (BMI MUST BE CHOSEN) 30 or less filed at 05/12/2025 0851          Modified Frailty Index    No data to display       [id number] Stroke Risk Points  Current as of just now        N/A 0 to 9 Points:      Last Change: N/A          The [id number] risk score (Lip GH, et  al. 2009. © 2010 [employer]) quantifies the risk of stroke for a patient with atrial fibrillation. For patients without atrial fibrillation or under the age of 18 this score appears as N/A. Higher score values generally indicate higher risk of stroke.        This score is not applicable to this patient. Components are not calculated.          Revised Cardiac Risk Index      Flowsheet Row Pre-Admission Testing from 5/12/2025 in [facility]   High-Risk Surgery (Intraperitoneal, Intrathoracic,Suprainguinal vascular) 0 filed at 05/12/2025 0851   History of ischemic heart disease (History of MI, History of positive exercuse test, Current chest paint considered due to myocardial ischemia, Use of nitrate therapy, ECG with pathological Q Waves) 0 filed at 05/12/2025 0851   History of congestive heart failure (pulmonary edemia, bilateral rales or S3 gallop, Paroxysmal nocturnal dyspnea, CXR showing pulmonary vascular redistribution) 0 filed at 05/12/2025 0851   History of cerebrovascular disease (Prior TIA or stroke) 0 filed at 05/12/2025 0851   Pre-operative insulin treatment 0 filed at 05/12/2025 0851   Pre-operative creatinine>2 mg/dl 0 filed at 05/12/2025 0851   Revised Cardiac Risk Calculator 0 filed at 05/12/2025 0851          Apfel Simplified Score    No data to display       Risk Analysis Index Results This Encounter    No data found in the last 10 encounters.       Stop Bang Score      Flowsheet Row Pre-Admission Testing from 5/12/2025 in [facility] Questionnaire Series Submission from 5/5/2025 in [facility] OR with Generic Provider Jazmin   Do you snore loudly? 0 filed at 05/12/2025 0820 1  filed at 05/05/2025 2140   Do you often feel tired or fatigued after your sleep? 0 filed at 05/12/2025 0820 0  filed at 05/05/2025 2140   Has anyone ever observed you stop breathing in your sleep? 0 filed at 05/12/2025 0820 0  filed at 05/05/2025 2140    Do you have or are you being treated for high blood pressure? 0 filed at 05/12/2025 0820 1  filed at 05/05/2025 2140   Recent BMI (Calculated) 26.3 filed at 05/12/2025 0820 25.8  filed at 05/05/2025 2140   Is BMI greater than 35 kg/m2? 0=No filed at 05/12/2025 0820 0=No  filed at 05/05/2025 2140   Age older than 50 years old? 1=Yes filed at 05/12/2025 0820 1=Yes  filed at 05/05/2025 2140   Is your neck circumference greater than 17 inches (Male) or 16 inches (Female)? 0 filed at 05/12/2025 0820 --   Gender - Male 1=Yes filed at 05/12/2025 0820 1=Yes  filed at 05/05/2025 2140   STOP-BANG Total Score 2 filed at 05/12/2025 0820 --          Prodigy: High Risk  Total Score: 16              Prodigy Age Score      Prodigy Gender Score          ARISCAT Score for Postoperative Pulmonary Complications    No data to display       Kristina Perioperative Risk for Myocardial Infarction or Cardiac Arrest (ADDISON)      Flowsheet Row Pre-Admission Testing [facility] 5/12/2025 in [facility]   Calculated Age Score 1.36 filed at 05/12/2025 0851   Functional Status  0 filed at 05/12/2025 0851   ASA Class  -3.29 filed at 05/12/2025 0851   Creatinine 0 filed at 05/12/2025 0851   Type of Procedure  -0.26 filed at 05/12/2025 0851   ADDISON Total Score  -7.44 filed at 05/12/2025 0851   ADDISON % 0.06 filed at 05/12/2025 0851              Assessment & Plan:    Neuro:  Anxiety (escitalopram)     HEENT/Airway:  No diagnosis or significant findings on chart review or clinical presentation and evaluation.   STOP-BANG Score-2  points low risk for KERI    Mallampati::  II    TM distance::  >3 FB    Neck ROM::  Full  Dentures-denies  Crowns-denies  Implants-denies    Cardiovascular:  HTN, HLD (metoprolol, amlodipine, simvastatin)   METS: 9.9  RCRI: 0 points, 3.9%  risk for postoperative MACE   ADDISON: 0.06% risk for postoperative MACE  EKG -nonspecific ST and T wave changes Rate-60  No acute changes.     Pulmonary:  No diagnosis or significant  findings on chart review or clinical presentation and evaluation.   ARISCAT: <26 points, 1.6% risk of in-hospital postoperative pulmonary complication  PRODIGY: Moderate risk for opioid induced respiratory depression  Smoking History-He has never smoked.  Deep breathing handout given    Renal/Urinary:    Prostate cancer   the patient is at increased risk of perioperative renal complications secondary to HTN. Preventative measures include BP monitoring, medication compliance, and hydration management.   CMP-Pending  Creatinine-  GFR-    Endocrine:  No diagnosis or significant findings on chart review or clinical presentation and evaluation.     Hematologic/Immunology:  No diagnosis or significant findings on chart review or clinical presentation and evaluation.  The patient is not a Jehovah’s witness and will accept blood and blood products if medically indicated.   History of previous blood transfusions No  CBC  HGB-  Caprini Score 7, patient at Low for postoperative DVT. Pt supplied education/VTE handout  Anticoagulation use: No     Gastrointestinal:   No diagnosis or significant findings on chart review or clinical presentation and evaluation.   Recreational drug use: none  Alcohol use 2 glasses of wine per month    Infectious disease:   No diagnosis or significant findings on chart review or clinical presentation and evaluation.     Musculoskeletal:   Acute knee pain    Anesthesia:  ASA 2 - Patient with mild systemic disease with no functional limitations  Anticipated anesthesia-general  History of General anesthesia- yes  Complications- No anesthesia complications  No family history of anesthesia complications    Labs & Imaging ordered:  CBC, CMP, UA  Nickel/metal allergy-negative  Shellfish allergy-negative    Discussed with patient medication instructions, NPO guidelines, and any questions or concerns.     time spent 45 minutes          [1]   Past Medical History:  Diagnosis Date    Anxiety 2022    Cancer  (Multi) June 2019    Hypertension ?    Personal history of malignant neoplasm of prostate     History of malignant neoplasm of prostate    Personal history of other diseases of the circulatory system     History of hypertension   [2]   Past Surgical History:  Procedure Laterality Date    EYE SURGERY  1996    OTHER SURGICAL HISTORY  01/16/2020    Prostate biopsy    PROSTATE SURGERY  2022   [3]   Family History  Problem Relation Name Age of Onset    Hypertension Mother Mardelle     Blood Disorder Father Abraham     Cancer Brother Augustin     Cancer Brother Hernán     Cancer Brother Edy     Cancer Brother Augustin     Cancer Brother Hernán     Cancer Brother Edy    [4] No Known Allergies

## 2025-05-12 NOTE — CPM/PAT H&P
[facility]/PAT Evaluation       Name: Medardo Mcdonald (Medardo Mcdonald)  /Age: 1955/69 y.o.     In-Person       Chief Complaint: Malignant neoplasm of prostate     HPI  Patient is an alert and oriented 69 year old male scheduled for a BIOPSY, PROSTATE, WITH IMAGING GUIDANCE on 5/15/25 with Dr. Mendez for  Malignant neoplasm of prostate . PMHX includes HTN, anxiety . Presents to [facility] today for perioperative risk stratification and optimization.     Medical History[1]    Surgical History[2]    Patient  has no history on file for sexual activity.    Family History[3]    Allergies[4]    Prior to Admission medications    Medication Sig Start Date End Date Taking? Authorizing Provider   amLODIPine (Norvasc) 5 mg tablet TAKE 1 TABLET DAILY 12/3/24   Milly Alfredo MD   escitalopram (Lexapro) 10 mg tablet TAKE 1 TABLET DAILY 24   Milly Alfredo MD   losartan (Cozaar) 50 mg tablet TAKE 1 TABLET DAILY 24   Milly Alfredo MD   metoprolol succinate XL (Toprol-XL) 50 mg 24 hr tablet TAKE 1 TABLET DAILY 12/3/24   Milly Alfredo MD   simvastatin (Zocor) 40 mg tablet TAKE 1 TABLET DAILY AT BEDTIME 25   Milly Alfredo MD         Review of Systems   Constitutional: Negative for chills, decreased appetite, diaphoresis, fever and malaise/fatigue.   Eyes:  Negative for blurred vision and double vision.   Cardiovascular:  Negative for chest pain, claudication, cyanosis, dyspnea on exertion, irregular heartbeat, leg swelling, near-syncope and palpitations.   Respiratory:  Negative for cough, hemoptysis, shortness of breath and wheezing.    Endocrine: Negative for cold intolerance, heat intolerance, polydipsia, polyphagia and polyuria.   Gastrointestinal:  Negative for abdominal pain, constipation, diarrhea, dysphagia, nausea and vomiting.   Genitourinary:  Negative for bladder incontinence, dysuria, hematuria, incomplete emptying, nocturia, frequency, pelvic pain and urgency.   Neurological:  Negative for headaches, light-headedness,  paresthesias, sensory change and weakness.   Psychiatric/Behavioral:  Negative for altered mental status.    Musculoskeletal: Negative for myalgias, arthralgias     Vitals and nursing note reviewed.     Physical exam  Constitutional:       Appearance: Normal appearance. He is Overweight.   HENT:      Head: Normocephalic and atraumatic.      Mouth/Throat:      Mouth: Mucous membranes are moist.      Pharynx: Oropharynx is clear.   Eyes:      Extraocular Movements: Extraocular movements intact.      Conjunctiva/sclera: Conjunctivae normal.      Pupils: Pupils are equal, round, and reactive to light.   Cardiovascular:      PMI at left midclavicular line. Normal rate. Regular rhythm. Normal S1. Normal S2.       Murmurs: There is no murmur.      No gallop.  No click. No rub.       No audible carotid bruit     No lower extremity edema on exam  Pulmonary:      Effort: Pulmonary effort is normal.      Breath sounds: Normal breath sounds.   Abdominal:      General: Abdomen is flat. Bowel sounds are normal.      Palpations: Abdomen is soft and non-tender  Musculoskeletal:      Cervical back: Normal range of motion and neck supple.   Skin:     General: Skin is warm and dry.      Capillary Refill: Capillary refill takes less than 2 seconds.   Neurological:      General: No focal deficit present.      Mental Status: He is alert and oriented to person, place, and time. Mental status is at baseline.   Psychiatric:         Mood and Affect: Mood normal.         Behavior: Behavior normal.         Thought Content: Thought content normal.         Judgment: Judgment normal.     Vitals and nursing note reviewed. Physical exam within normal limits.       DASI Risk Score      Flowsheet Row Questionnaire Series Submission from 5/5/2025 in [facility] OR with Generic Provider Jazmin   Can you take care of yourself (eat, dress, bathe, or use toilet)?  2.75  filed at 05/05/2025 7370   Can you walk indoors, such as around your  house? 1.75  filed at 05/05/2025 2140   Can you walk a block or two on level ground?  2.75  filed at 05/05/2025 2140   Can you climb a flight of stairs or walk up a hill? 5.5  filed at 05/05/2025 2140   Can you run a short distance? 8  filed at 05/05/2025 2140   Can you do light work around the house like dusting or washing dishes? 2.7  filed at 05/05/2025 2140   Can you do moderate work around the house like vacuuming, sweeping floors or carrying groceries? 3.5  filed at 05/05/2025 2140   Can you do heavy work around the house like scrubbing floors or lifting and moving heavy furniture?  8  filed at 05/05/2025 2140   Can you do yard work like raking leaves, weeding or pushing a mower? 4.5  filed at 05/05/2025 2140   Can you have sexual relations? 5.25  filed at 05/05/2025 2140   Can you participate in moderate recreational activities like golf, bowling, dancing, doubles tennis or throwing a baseball or football? 6  filed at 05/05/2025 2140   Can you participate in strenous sports like swimming, singles tennis, football, basketball, or skiing? 7.5  filed at 05/05/2025 2140   DASI SCORE 58.2  filed at 05/05/2025 2140   METS Score (Will be calculated only when all the questions are answered) 9.9  filed at 05/05/2025 2140          Caprini DVT Assessment      Flowsheet Row Pre-Admission Testing from 5/12/2025 in [facility]   DVT Score (IF A SCORE IS NOT CALCULATING, MUST SELECT A BMI TO COMPLETE) 7 filed at 05/12/2025 0851   Medical Factors Present cancer, chemotherapy, or previous malignancy filed at 05/12/2025 0851   Surgical Factors Major surgery planned, including arthroscopic and laproscopic (1-2 hours) filed at 05/12/2025 0851   BMI (BMI MUST BE CHOSEN) 30 or less filed at 05/12/2025 0851          Modified Frailty Index    No data to display       [id number] Stroke Risk Points  Current as of just now        N/A 0 to 9 Points:      Last Change: N/A          The [id number] risk score (Lip GH, et  al. 2009. © 2010 American [employer]) quantifies the risk of stroke for a patient with atrial fibrillation. For patients without atrial fibrillation or under the age of 18 this score appears as N/A. Higher score values generally indicate higher risk of stroke.        This score is not applicable to this patient. Components are not calculated.          Revised Cardiac Risk Index      Flowsheet Row Pre-Admission Testing from 5/12/2025 in [facility]   High-Risk Surgery (Intraperitoneal, Intrathoracic,Suprainguinal vascular) 0 filed at 05/12/2025 0851   History of ischemic heart disease (History of MI, History of positive exercuse test, Current chest paint considered due to myocardial ischemia, Use of nitrate therapy, ECG with pathological Q Waves) 0 filed at 05/12/2025 0851   History of congestive heart failure (pulmonary edemia, bilateral rales or S3 gallop, Paroxysmal nocturnal dyspnea, CXR showing pulmonary vascular redistribution) 0 filed at 05/12/2025 0851   History of cerebrovascular disease (Prior TIA or stroke) 0 filed at 05/12/2025 0851   Pre-operative insulin treatment 0 filed at 05/12/2025 0851   Pre-operative creatinine>2 mg/dl 0 filed at 05/12/2025 0851   Revised Cardiac Risk Calculator 0 filed at 05/12/2025 0851          Apfel Simplified Score    No data to display       Risk Analysis Index Results This Encounter    No data found in the last 10 encounters.       Stop Bang Score      Flowsheet Row Pre-Admission Testing from 5/12/2025 in [facility] Questionnaire Series Submission from 5/5/2025 in [facility] OR with Generic Provider Jazmin   Do you snore loudly? 0 filed at 05/12/2025 0820 1  filed at 05/05/2025 2140   Do you often feel tired or fatigued after your sleep? 0 filed at 05/12/2025 0820 0  filed at 05/05/2025 2140   Has anyone ever observed you stop breathing in your sleep? 0 filed at 05/12/2025 0820 0  filed at 05/05/2025 2140    Do you have or are you being treated for high blood pressure? 0 filed at 05/12/2025 0820 1  filed at 05/05/2025 2140   Recent BMI (Calculated) 26.3 filed at 05/12/2025 0820 25.8  filed at 05/05/2025 2140   Is BMI greater than 35 kg/m2? 0=No filed at 05/12/2025 0820 0=No  filed at 05/05/2025 2140   Age older than 50 years old? 1=Yes filed at 05/12/2025 0820 1=Yes  filed at 05/05/2025 2140   Is your neck circumference greater than 17 inches (Male) or 16 inches (Female)? 0 filed at 05/12/2025 0820 --   Gender - Male 1=Yes filed at 05/12/2025 0820 1=Yes  filed at 05/05/2025 2140   STOP-BANG Total Score 2 filed at 05/12/2025 0820 --          Prodigy: High Risk  Total Score: 16              Prodigy Age Score      Prodigy Gender Score          ARISCAT Score for Postoperative Pulmonary Complications    No data to display       Kristina Perioperative Risk for Myocardial Infarction or Cardiac Arrest (ADDISON)      Flowsheet Row Pre-Admission Testing from 5/12/2025 in [facility]   Calculated Age Score 1.36 filed at 05/12/2025 0851   Functional Status  0 filed at 05/12/2025 0851   ASA Class  -3.29 filed at 05/12/2025 0851   Creatinine 0 filed at 05/12/2025 0851   Type of Procedure  -0.26 filed at 05/12/2025 0851   ADDISON Total Score  -7.44 filed at 05/12/2025 0851   ADDISON % 0.06 filed at 05/12/2025 0851              Assessment & Plan:    Neuro:  Anxiety (escitalopram)     HEENT/Airway:  No diagnosis or significant findings on chart review or clinical presentation and evaluation.   STOP-BANG Score-2  points low risk for KERI    Mallampati::  II    TM distance::  >3 FB    Neck ROM::  Full  Dentures-denies  Crowns-denies  Implants-denies    Cardiovascular:  HTN, HLD (metoprolol, amlodipine, simvastatin)   METS: 9.9  RCRI: 0 points, 3.9%  risk for postoperative MACE   ADDISON: 0.06% risk for postoperative MACE  EKG -nonspecific ST and T wave changes Rate-60  No acute changes.     Pulmonary:  No diagnosis or significant  findings on chart review or clinical presentation and evaluation.   ARISCAT: <26 points, 1.6% risk of in-hospital postoperative pulmonary complication  PRODIGY: Moderate risk for opioid induced respiratory depression  Smoking History-He has never smoked.  Deep breathing handout given    Renal/Urinary:    Prostate cancer   the patient is at increased risk of perioperative renal complications secondary to HTN. Preventative measures include BP monitoring, medication compliance, and hydration management.   CMP-Pending  Creatinine-  GFR-    Endocrine:  No diagnosis or significant findings on chart review or clinical presentation and evaluation.     Hematologic/Immunology:  No diagnosis or significant findings on chart review or clinical presentation and evaluation.  The patient is not a Jehovah’s witness and will accept blood and blood products if medically indicated.   History of previous blood transfusions No  CBC  HGB-  Caprini Score 7, patient at Low for postoperative DVT. Pt supplied education/VTE handout  Anticoagulation use: No     Gastrointestinal:   No diagnosis or significant findings on chart review or clinical presentation and evaluation.   Recreational drug use: none  Alcohol use 2 glasses of wine per month    Infectious disease:   No diagnosis or significant findings on chart review or clinical presentation and evaluation.     Musculoskeletal:   Acute knee pain    Anesthesia:  ASA 2 - Patient with mild systemic disease with no functional limitations  Anticipated anesthesia-general  History of General anesthesia- yes  Complications- No anesthesia complications  No family history of anesthesia complications    Labs & Imaging ordered:  CBC, CMP, UA  Nickel/metal allergy-negative  Shellfish allergy-negative    Discussed with patient medication instructions, NPO guidelines, and any questions or concerns.     time spent 45 minutes          [1]   Past Medical History:  Diagnosis Date    Anxiety 2022    Cancer  (Multi) June 2019    Hypertension ?    Personal history of malignant neoplasm of prostate     History of malignant neoplasm of prostate    Personal history of other diseases of the circulatory system     History of hypertension   [2]   Past Surgical History:  Procedure Laterality Date    EYE SURGERY  1996    OTHER SURGICAL HISTORY  01/16/2020    Prostate biopsy    PROSTATE SURGERY  2022   [3]   Family History  Problem Relation Name Age of Onset    Hypertension Mother Mardelle     Blood Disorder Father Abraham     Cancer Brother Augustin     Cancer Brother Hernán     Cancer Brother Edy     Cancer Brother Augustin     Cancer Brother Hernán     Cancer Brother Edy    [4] No Known Allergies

## 2025-05-15 ENCOUNTER — ANESTHESIA EVENT (OUTPATIENT)
Dept: OPERATING ROOM | Facility: HOSPITAL | Age: 70
End: 2025-05-15
Payer: COMMERCIAL

## 2025-05-15 ENCOUNTER — HOSPITAL ENCOUNTER (OUTPATIENT)
Facility: HOSPITAL | Age: 70
Setting detail: OUTPATIENT SURGERY
Discharge: HOME | End: 2025-05-15
Attending: UROLOGY | Admitting: UROLOGY
Payer: COMMERCIAL

## 2025-05-15 ENCOUNTER — ANESTHESIA (OUTPATIENT)
Dept: OPERATING ROOM | Facility: HOSPITAL | Age: 70
End: 2025-05-15
Payer: COMMERCIAL

## 2025-05-15 VITALS
DIASTOLIC BLOOD PRESSURE: 71 MMHG | BODY MASS INDEX: 25.69 KG/M2 | OXYGEN SATURATION: 96 % | HEIGHT: 67 IN | RESPIRATION RATE: 14 BRPM | TEMPERATURE: 97 F | SYSTOLIC BLOOD PRESSURE: 120 MMHG | HEART RATE: 64 BPM | WEIGHT: 163.7 LBS

## 2025-05-15 DIAGNOSIS — C61 MALIGNANT NEOPLASM OF PROSTATE (MULTI): ICD-10-CM

## 2025-05-15 PROCEDURE — A55700 PR BIOPSY OF PROSTATE,NEEDLE/PUNCH: Performed by: ANESTHESIOLOGY

## 2025-05-15 PROCEDURE — 3700000002 HC GENERAL ANESTHESIA TIME - EACH INCREMENTAL 1 MINUTE: Performed by: UROLOGY

## 2025-05-15 PROCEDURE — 3600000009 HC OR TIME - EACH INCREMENTAL 1 MINUTE - PROCEDURE LEVEL FOUR: Performed by: UROLOGY

## 2025-05-15 PROCEDURE — 2720000007 HC OR 272 NO HCPCS: Performed by: UROLOGY

## 2025-05-15 PROCEDURE — 7100000010 HC PHASE TWO TIME - EACH INCREMENTAL 1 MINUTE: Performed by: UROLOGY

## 2025-05-15 PROCEDURE — 3600000004 HC OR TIME - INITIAL BASE CHARGE - PROCEDURE LEVEL FOUR: Performed by: UROLOGY

## 2025-05-15 PROCEDURE — 7100000002 HC RECOVERY ROOM TIME - EACH INCREMENTAL 1 MINUTE: Performed by: UROLOGY

## 2025-05-15 PROCEDURE — 7100000001 HC RECOVERY ROOM TIME - INITIAL BASE CHARGE: Performed by: UROLOGY

## 2025-05-15 PROCEDURE — 3700000001 HC GENERAL ANESTHESIA TIME - INITIAL BASE CHARGE: Performed by: UROLOGY

## 2025-05-15 PROCEDURE — 2500000004 HC RX 250 GENERAL PHARMACY W/ HCPCS (ALT 636 FOR OP/ED): Mod: JZ | Performed by: UROLOGY

## 2025-05-15 PROCEDURE — 2500000004 HC RX 250 GENERAL PHARMACY W/ HCPCS (ALT 636 FOR OP/ED): Performed by: NURSE ANESTHETIST, CERTIFIED REGISTERED

## 2025-05-15 PROCEDURE — A55700 PR BIOPSY OF PROSTATE,NEEDLE/PUNCH: Performed by: NURSE ANESTHETIST, CERTIFIED REGISTERED

## 2025-05-15 PROCEDURE — 7100000009 HC PHASE TWO TIME - INITIAL BASE CHARGE: Performed by: UROLOGY

## 2025-05-15 PROCEDURE — C1819 TISSUE LOCALIZATION-EXCISION: HCPCS | Performed by: UROLOGY

## 2025-05-15 RX ORDER — HYDROMORPHONE HYDROCHLORIDE 0.2 MG/ML
0.1 INJECTION INTRAMUSCULAR; INTRAVENOUS; SUBCUTANEOUS EVERY 5 MIN PRN
Status: DISCONTINUED | OUTPATIENT
Start: 2025-05-15 | End: 2025-05-15 | Stop reason: HOSPADM

## 2025-05-15 RX ORDER — ONDANSETRON HYDROCHLORIDE 2 MG/ML
INJECTION, SOLUTION INTRAVENOUS AS NEEDED
Status: DISCONTINUED | OUTPATIENT
Start: 2025-05-15 | End: 2025-05-15

## 2025-05-15 RX ORDER — LIDOCAINE HYDROCHLORIDE 10 MG/ML
0.1 INJECTION, SOLUTION EPIDURAL; INFILTRATION; INTRACAUDAL; PERINEURAL ONCE
Status: DISCONTINUED | OUTPATIENT
Start: 2025-05-15 | End: 2025-05-15 | Stop reason: HOSPADM

## 2025-05-15 RX ORDER — PROPOFOL 10 MG/ML
INJECTION, EMULSION INTRAVENOUS AS NEEDED
Status: DISCONTINUED | OUTPATIENT
Start: 2025-05-15 | End: 2025-05-15

## 2025-05-15 RX ORDER — MIDAZOLAM HYDROCHLORIDE 1 MG/ML
INJECTION, SOLUTION INTRAMUSCULAR; INTRAVENOUS AS NEEDED
Status: DISCONTINUED | OUTPATIENT
Start: 2025-05-15 | End: 2025-05-15

## 2025-05-15 RX ORDER — ALBUTEROL SULFATE 0.83 MG/ML
2.5 SOLUTION RESPIRATORY (INHALATION) ONCE AS NEEDED
Status: DISCONTINUED | OUTPATIENT
Start: 2025-05-15 | End: 2025-05-15 | Stop reason: HOSPADM

## 2025-05-15 RX ORDER — ONDANSETRON HYDROCHLORIDE 2 MG/ML
4 INJECTION, SOLUTION INTRAVENOUS ONCE AS NEEDED
Status: DISCONTINUED | OUTPATIENT
Start: 2025-05-15 | End: 2025-05-15 | Stop reason: HOSPADM

## 2025-05-15 RX ORDER — IBUPROFEN 200 MG
400 TABLET ORAL EVERY 6 HOURS PRN
COMMUNITY

## 2025-05-15 RX ORDER — FENTANYL CITRATE 50 UG/ML
INJECTION, SOLUTION INTRAMUSCULAR; INTRAVENOUS AS NEEDED
Status: DISCONTINUED | OUTPATIENT
Start: 2025-05-15 | End: 2025-05-15

## 2025-05-15 RX ORDER — SODIUM CHLORIDE, SODIUM LACTATE, POTASSIUM CHLORIDE, CALCIUM CHLORIDE 600; 310; 30; 20 MG/100ML; MG/100ML; MG/100ML; MG/100ML
100 INJECTION, SOLUTION INTRAVENOUS CONTINUOUS
Status: DISCONTINUED | OUTPATIENT
Start: 2025-05-15 | End: 2025-05-15 | Stop reason: HOSPADM

## 2025-05-15 RX ORDER — SODIUM CHLORIDE, SODIUM LACTATE, POTASSIUM CHLORIDE, CALCIUM CHLORIDE 600; 310; 30; 20 MG/100ML; MG/100ML; MG/100ML; MG/100ML
INJECTION, SOLUTION INTRAVENOUS CONTINUOUS PRN
Status: DISCONTINUED | OUTPATIENT
Start: 2025-05-15 | End: 2025-05-15

## 2025-05-15 RX ORDER — LIDOCAINE HYDROCHLORIDE 10 MG/ML
INJECTION, SOLUTION INFILTRATION; PERINEURAL AS NEEDED
Status: DISCONTINUED | OUTPATIENT
Start: 2025-05-15 | End: 2025-05-15

## 2025-05-15 RX ORDER — HYDROMORPHONE HYDROCHLORIDE 0.2 MG/ML
0.2 INJECTION INTRAMUSCULAR; INTRAVENOUS; SUBCUTANEOUS EVERY 5 MIN PRN
Status: DISCONTINUED | OUTPATIENT
Start: 2025-05-15 | End: 2025-05-15 | Stop reason: HOSPADM

## 2025-05-15 RX ORDER — LEVOFLOXACIN 5 MG/ML
500 INJECTION, SOLUTION INTRAVENOUS ONCE
Status: COMPLETED | OUTPATIENT
Start: 2025-05-15 | End: 2025-05-15

## 2025-05-15 RX ORDER — HYDRALAZINE HYDROCHLORIDE 20 MG/ML
5 INJECTION INTRAMUSCULAR; INTRAVENOUS EVERY 30 MIN PRN
Status: DISCONTINUED | OUTPATIENT
Start: 2025-05-15 | End: 2025-05-15 | Stop reason: HOSPADM

## 2025-05-15 RX ORDER — SODIUM CHLORIDE, SODIUM LACTATE, POTASSIUM CHLORIDE, CALCIUM CHLORIDE 600; 310; 30; 20 MG/100ML; MG/100ML; MG/100ML; MG/100ML
20 INJECTION, SOLUTION INTRAVENOUS CONTINUOUS
Status: CANCELLED | OUTPATIENT
Start: 2025-05-15 | End: 2025-05-16

## 2025-05-15 RX ORDER — CEFAZOLIN SODIUM 2 G/100ML
2 INJECTION, SOLUTION INTRAVENOUS ONCE
Status: CANCELLED | OUTPATIENT
Start: 2025-05-15 | End: 2025-05-15

## 2025-05-15 RX ADMIN — MIDAZOLAM 2 MG: 1 INJECTION INTRAMUSCULAR; INTRAVENOUS at 07:14

## 2025-05-15 RX ADMIN — ONDANSETRON 4 MG: 2 INJECTION, SOLUTION INTRAMUSCULAR; INTRAVENOUS at 07:24

## 2025-05-15 RX ADMIN — LEVOFLOXACIN 500 MG: 5 INJECTION, SOLUTION INTRAVENOUS at 07:15

## 2025-05-15 RX ADMIN — FENTANYL CITRATE 25 MCG: 50 INJECTION, SOLUTION INTRAMUSCULAR; INTRAVENOUS at 07:26

## 2025-05-15 RX ADMIN — FENTANYL CITRATE 50 MCG: 50 INJECTION, SOLUTION INTRAMUSCULAR; INTRAVENOUS at 07:19

## 2025-05-15 RX ADMIN — PROPOFOL 100 MG: 10 INJECTION, EMULSION INTRAVENOUS at 07:19

## 2025-05-15 RX ADMIN — PROPOFOL 25 MG: 10 INJECTION, EMULSION INTRAVENOUS at 07:26

## 2025-05-15 RX ADMIN — PROPOFOL 25 MG: 10 INJECTION, EMULSION INTRAVENOUS at 07:23

## 2025-05-15 RX ADMIN — FENTANYL CITRATE 25 MCG: 50 INJECTION, SOLUTION INTRAMUSCULAR; INTRAVENOUS at 07:22

## 2025-05-15 RX ADMIN — PROPOFOL 25 MG: 10 INJECTION, EMULSION INTRAVENOUS at 07:22

## 2025-05-15 RX ADMIN — PROPOFOL 25 MG: 10 INJECTION, EMULSION INTRAVENOUS at 07:21

## 2025-05-15 RX ADMIN — SODIUM CHLORIDE, POTASSIUM CHLORIDE, SODIUM LACTATE AND CALCIUM CHLORIDE: 600; 310; 30; 20 INJECTION, SOLUTION INTRAVENOUS at 07:14

## 2025-05-15 RX ADMIN — LIDOCAINE HYDROCHLORIDE 5 ML: 10 INJECTION, SOLUTION INFILTRATION; PERINEURAL at 07:19

## 2025-05-15 SDOH — HEALTH STABILITY: MENTAL HEALTH: CURRENT SMOKER: 0

## 2025-05-15 ASSESSMENT — PAIN SCALES - GENERAL
PAINLEVEL_OUTOF10: 0 - NO PAIN

## 2025-05-15 ASSESSMENT — PAIN - FUNCTIONAL ASSESSMENT
PAIN_FUNCTIONAL_ASSESSMENT: 0-10
PAIN_FUNCTIONAL_ASSESSMENT: 0-10
PAIN_FUNCTIONAL_ASSESSMENT: WONG-BAKER FACES
PAIN_FUNCTIONAL_ASSESSMENT: 0-10

## 2025-05-15 ASSESSMENT — COLUMBIA-SUICIDE SEVERITY RATING SCALE - C-SSRS
1. IN THE PAST MONTH, HAVE YOU WISHED YOU WERE DEAD OR WISHED YOU COULD GO TO SLEEP AND NOT WAKE UP?: NO
6. HAVE YOU EVER DONE ANYTHING, STARTED TO DO ANYTHING, OR PREPARED TO DO ANYTHING TO END YOUR LIFE?: NO
2. HAVE YOU ACTUALLY HAD ANY THOUGHTS OF KILLING YOURSELF?: NO

## 2025-05-15 NOTE — DISCHARGE INSTRUCTIONS
Expect blood in the urine and stool for 3 to 4 days.  Expect blood in the semen for 6 to 8 weeks.  Call if there is a fever greater than 101.5.    In case of emergency call the office at [phone].    If unable to reach the office call 120.    Because you have had anesthesia you should not drive nor engage in any heavy activity for 24 hours.    If there are any signs of infection such as fever >101.5, redness or pus from an incision, or unexpected burning with urination, contact the doctor at [phone].    If you have pain beyond what is expected for the procedure call the doctor at [phone]

## 2025-05-15 NOTE — ANESTHESIA PREPROCEDURE EVALUATION
Patient: Medardo Mcdonald    Procedure Information       Date/Time: 05/15/25 0700    Procedure: BIOPSY, PROSTATE, WITH IMAGING GUIDANCE (REP: ENMANUEL HAIRSTONAQUIN 500MG  MRI DONE 3/27/25)    Location: [facility] OR 01 / Virtual [facility] OR    Surgeons: Rigoberto Mendez MD        Medical History[1]     Relevant Problems   Cardiac   (+) Benign essential hypertension   (+) Hypertensive disorder   (+) Mixed hyperlipidemia      Neuro   (+) Anxiety attack   (+) Generalized anxiety disorder      /Renal   (+) Benign prostatic hyperplasia   (+) Malignant tumor of prostate (Multi)     Surgical History[2]   Clinical information reviewed:   Tobacco  Allergies  Meds   Med Hx  Surg Hx   Fam Hx          NPO Detail:  No data recorded     Physical Exam    Airway  Mallampati: II  TM distance: >3 FB  Neck ROM: full     Cardiovascular    Dental    Pulmonary    Abdominal            Anesthesia Plan    History of general anesthesia?: yes  History of complications of general anesthesia?: no    ASA 2     MAC     The patient is not a current smoker.  Patient was not previously instructed to abstain from smoking on day of procedure.  Patient did not smoke on day of procedure.    intravenous induction   Postoperative pain plan includes opioids.  Anesthetic plan and risks discussed with patient.    Plan discussed with attending.           [1]   Past Medical History:  Diagnosis Date    Anxiety 2022    Cancer (Multi) June 2019    Hypertension ?    Personal history of malignant neoplasm of prostate     History of malignant neoplasm of prostate    Personal history of other diseases of the circulatory system     History of hypertension   [2]   Past Surgical History:  Procedure Laterality Date    EYE SURGERY  1996    OTHER SURGICAL HISTORY  01/16/2020    Prostate biopsy    PROSTATE SURGERY  2022

## 2025-05-15 NOTE — NURSING NOTE
Patient in Phase 2; dressed and up to chair with RN assist. Tolerating po fluids, no complaint of pain and no complaint of nausea.     Family at bedside; discussed discharge instructions with patient and Family. All questions at this time answered.     Discharge instructions provided using teachback method.  Patient's health-related risk factors discussed with patient.  Patient educated to look for worsening signs and symptoms and educated to seek medical attention if experiencing medical emergency.  Patient aware of needs to follow up with outpatient clinics as scheduled. Home going meds reviewed with patient.  Patient verbalized understanding of disposition and discharge instructions.  All questions answered to patient's satisfaction and within nursing scope of practice.    Patient clinically appropriate for discharge. Vitals stable/baseline.IV removed and patient transported to discharge area via wheelchair.

## 2025-05-15 NOTE — OP NOTE
BIOPSY, PROSTATE, WITH IMAGING GUIDANCE (REP: ENMANUEL LEVAQUIN 500MG  MRI DONE 3/27/25) Operative Note     Date: 5/15/2025  OR Location: [facility] OR    Name: Medardo Mcdonald, : 1955, Age: 69 y.o., MRN: [id number], Sex: male    Diagnosis  Pre-op Diagnosis      * Malignant neoplasm of prostate (Multi) [C61] Post-op Diagnosis     * Malignant neoplasm of prostate (Multi) [C61]     Procedures  BIOPSY, PROSTATE, WITH IMAGING GUIDANCE (REP: ENMANUEL LEVAQUIN 500MG  MRI DONE 3/27/25)  34302 - SD PROSTATE NEEDLE BIOPSY ANY APPROACH      Surgeons      * Rigoberto Mendez - Primary    Resident/Fellow/Other Assistant:  Surgeons and Role:  * No surgeons found with a matching role *    Staff:   Circulator: Eri Aguilar Person: Deborah    Anesthesia Staff: Anesthesiologist: Raul Li MD  CRNA: FAIZAN Talavera-CRNA    Procedure Summary  Anesthesia: Monitor Anesthesia Care  ASA: II  Estimated Blood Loss: 0 mL  Intra-op Medications:   Administrations occurring from 0700 to 0742 on 05/15/25:   Medication Name Total Dose   levoFLOXacin (Levaquin) 500 mg in dextrose 5%  mL 500 mg   fentaNYL PF 0.05 mg/mL 100 mcg   LR infusion Cannot be calculated   lidocaine (Xylocaine) 1 % 5 mL   midazolam (Versed) 1 mg/1 mL 2 mg   ondansetron (Zofran) 2 mg/mL injection 4 mg   propofol (Diprivan) injection 10 mg/mL 200 mg              Anesthesia Record               Intraprocedure I/O Totals          Intake    LR infusion 200.00 mL    levoFLOXacin (Levaquin) 500 mg in dextrose 5%  mL 100.00 mL    Total Intake 300 mL          Specimen:   ID Type Source Tests Collected by Time   1 : AREA OF INTEREST 1 - RIGHT MID POSTERIOR Tissue PROSTATE BIOPSY TARGETED SUSY SURGICAL PATHOLOGY EXAM Rigoberto Mendez MD 5/15/2025 0657   2 : AREA OF INTEREST 2 - RIGHT MID ANTERIOR Tissue PROSTATE BIOPSY TARGETED SUSY SURGICAL PATHOLOGY EXAM Rigoberto Mendez MD 5/15/2025 0657   3 : RIGHT MID MEDIAL Tissue PROSTATE, BIOPSY, RIGHT MEDIAL MID SURGICAL  PATHOLOGY EXAM Rigoberto Mendez MD 5/15/2025 0657   4 : RIGHT BASE MEDIAL Tissue PROSTATE, BIOPSY, RIGHT MEDIAL BASE SURGICAL PATHOLOGY EXAM Rigoberto Mendez MD 5/15/2025 0657   5 : RIGHT APEX MEDIAL Tissue PROSTATE, BIOPSY, RIGHT MEDIAL APEX SURGICAL PATHOLOGY EXAM Rigoberto Mendez MD 5/15/2025 0657   6 : RIGHT MID LATERAL Tissue PROSTATE, BIOPSY, RIGHT LATERAL MID SURGICAL PATHOLOGY EXAM Rigoberto Mendez MD 5/15/2025 0657   7 : RIGHT BASE LATERAL Tissue PROSTATE, BIOPSY, RIGHT LATERAL BASE SURGICAL PATHOLOGY EXAM Rigoberto Mendez MD 5/15/2025 0657   A : LEFT LATERAL APEX  PROSTATE, BIOPSY, LEFT LATERAL APEX  Rigoberto Mendez MD 5/15/2025 0657   B : LEFT LATERAL BASE  PROSTATE, BIOPSY, LEFT LATERAL BASE  Rigoberto Mendez MD 5/15/2025 0657   C : LEFT MID LATERAL  PROSTATE, BIOPSY, LEFT LATERAL MID  Rigoberto Mendez MD 5/15/2025 0657   D : LEFT APEX MEDIAL  PROSTATE, BIOPSY, LEFT MEDIAL APEX  Rigoberto Mendez MD 5/15/2025 0657   E : LEFT BASE MEDIAL  PROSTATE, BIOPSY, LEFT MEDIAL BASE  Rigoberto Mendez MD 5/15/2025 0657   F : LEFT MID MEDIAL  PROSTATE, BIOPSY, LEFT MEDIAL MID  Rigoberto Mendez MD 5/15/2025 0657   G : RIGHT APEX LATERAL  PROSTATE, BIOPSY, RIGHT LATERAL APEX  Rigoberto Mendez MD 5/15/2025 0657                 Drains and/or Catheters: * None in log *    Tourniquet Times:         Implants:     Findings: Central cyst in the prostate.  2 abnormal lesions on MRI.  There was an abnormal area on ultrasound which did not show up on MRI at the peripheral aspect of the right mid area    Indications: Medardo Mcdonald is an 69 y.o. male who is having surgery for C61 PROSTATE CANCER.       The patient was seen in the preoperative area. The risks, benefits, complications, treatment options, non-operative alternatives, expected recovery and outcomes were discussed with the patient. The possibilities of reaction to medication, pulmonary aspiration, injury to surrounding structures, bleeding,  recurrent infection, the need for additional procedures, failure to diagnose a condition, and creating a complication requiring transfusion or operation were discussed with the patient. The patient concurred with the proposed plan, giving informed consent.  The site of surgery was properly noted/marked if necessary per policy. The patient has been actively warmed in preoperative area. Preoperative antibiotics have been ordered and given within 1 hours of incision. Venous thrombosis prophylaxis are not indicated.    Procedure Details: Patient was taken to the operating room and placed in the left lateral decubitus position.  He was given intravenous sedation.  A prostate ultrasound was performed.  This showed the above findings.  12 biopsies were taken in the standard areas.  4 biopsies were taken in each of the abnormal areas.  The patient was then awakened and taken to the recovery room in stable condition.  Evidence of Infection: No   Complications:  None; patient tolerated the procedure well.    Disposition: PACU - hemodynamically stable.  Condition: stable                 Additional Details:       Attending Attestation: I performed the procedure.    Rigoberto Mendez  Phone Number: [phone]

## 2025-05-15 NOTE — ANESTHESIA POSTPROCEDURE EVALUATION
Patient: Medardo Mcdonald    Procedure Summary       Date: 05/15/25 Room / Location: [facility] OR 01 / [facility]    Anesthesia Start: 0714 Anesthesia Stop: 0737    Procedure: BIOPSY, PROSTATE, WITH IMAGING GUIDANCE (REP: ENMANUEL GIRON 500MG  MRI DONE 3/27/25) Diagnosis:       Malignant neoplasm of prostate (Multi)      (C61 PROSTATE CANCER)    Surgeons: Rigoberto Mendez MD Responsible Provider: Raul Li MD    Anesthesia Type: MAC ASA Status: 2            Anesthesia Type: MAC    Vitals Value Taken Time   /66 05/15/25 07:45   Temp 36.1 °C (97 °F) 05/15/25 07:33   Pulse 65 05/15/25 07:45   Resp 15 05/15/25 07:45   SpO2 97 % 05/15/25 07:45       Anesthesia Post Evaluation    Patient location during evaluation: PACU  Patient participation: complete - patient participated  Level of consciousness: awake  Pain management: adequate  Airway patency: patent  Cardiovascular status: acceptable  Respiratory status: acceptable  Hydration status: acceptable  Postoperative Nausea and Vomiting: none        There were no known notable events for this encounter.

## 2025-05-17 LAB
ATRIAL RATE: 60 BPM
P AXIS: 55 DEGREES
P OFFSET: 183 MS
P ONSET: 127 MS
PR INTERVAL: 176 MS
Q ONSET: 215 MS
QRS COUNT: 10 BEATS
QRS DURATION: 102 MS
QT INTERVAL: 390 MS
QTC CALCULATION(BAZETT): 390 MS
QTC FREDERICIA: 390 MS
R AXIS: 23 DEGREES
T AXIS: -4 DEGREES
T OFFSET: 410 MS
VENTRICULAR RATE: 60 BPM

## 2025-05-19 PROBLEM — F41.0 ANXIETY ATTACK: Status: RESOLVED | Noted: 2024-11-11 | Resolved: 2025-05-19

## 2025-05-19 PROBLEM — G47.00 INSOMNIA: Status: RESOLVED | Noted: 2023-06-20 | Resolved: 2025-05-19

## 2025-05-19 PROBLEM — R05.1 ACUTE COUGH: Status: RESOLVED | Noted: 2024-01-23 | Resolved: 2025-05-19

## 2025-05-19 PROBLEM — D18.01 HEMANGIOMA OF SKIN AND SUBCUTANEOUS TISSUE: Status: RESOLVED | Noted: 2019-08-21 | Resolved: 2025-05-19

## 2025-05-20 ENCOUNTER — APPOINTMENT (OUTPATIENT)
Dept: PRIMARY CARE | Facility: CLINIC | Age: 70
End: 2025-05-20
Payer: COMMERCIAL

## 2025-05-20 VITALS
HEART RATE: 60 BPM | BODY MASS INDEX: 25.93 KG/M2 | SYSTOLIC BLOOD PRESSURE: 124 MMHG | HEIGHT: 67 IN | TEMPERATURE: 97.3 F | DIASTOLIC BLOOD PRESSURE: 78 MMHG | WEIGHT: 165.2 LBS

## 2025-05-20 DIAGNOSIS — R06.83 SNORING: ICD-10-CM

## 2025-05-20 DIAGNOSIS — R73.09 ELEVATED GLUCOSE: ICD-10-CM

## 2025-05-20 DIAGNOSIS — G25.81 RESTLESS LEG SYNDROME: ICD-10-CM

## 2025-05-20 DIAGNOSIS — E78.2 HYPERLIPEMIA, MIXED: ICD-10-CM

## 2025-05-20 DIAGNOSIS — F41.1 GENERALIZED ANXIETY DISORDER: ICD-10-CM

## 2025-05-20 DIAGNOSIS — S76.191D OTHER SPECIFIED INJURY OF RIGHT QUADRICEPS MUSCLE, FASCIA AND TENDON, SUBSEQUENT ENCOUNTER: ICD-10-CM

## 2025-05-20 DIAGNOSIS — I10 BENIGN ESSENTIAL HYPERTENSION: ICD-10-CM

## 2025-05-20 DIAGNOSIS — Z00.00 HEALTH MAINTENANCE EXAMINATION: Primary | ICD-10-CM

## 2025-05-20 PROCEDURE — 1160F RVW MEDS BY RX/DR IN RCRD: CPT | Performed by: FAMILY MEDICINE

## 2025-05-20 PROCEDURE — 1159F MED LIST DOCD IN RCRD: CPT | Performed by: FAMILY MEDICINE

## 2025-05-20 PROCEDURE — 99214 OFFICE O/P EST MOD 30 MIN: CPT | Performed by: FAMILY MEDICINE

## 2025-05-20 PROCEDURE — 1126F AMNT PAIN NOTED NONE PRSNT: CPT | Performed by: FAMILY MEDICINE

## 2025-05-20 PROCEDURE — 3074F SYST BP LT 130 MM HG: CPT | Performed by: FAMILY MEDICINE

## 2025-05-20 PROCEDURE — 3008F BODY MASS INDEX DOCD: CPT | Performed by: FAMILY MEDICINE

## 2025-05-20 PROCEDURE — 3078F DIAST BP <80 MM HG: CPT | Performed by: FAMILY MEDICINE

## 2025-05-20 PROCEDURE — 99397 PER PM REEVAL EST PAT 65+ YR: CPT | Performed by: FAMILY MEDICINE

## 2025-05-20 ASSESSMENT — PROMIS GLOBAL HEALTH SCALE
RATE_QUALITY_OF_LIFE: VERY GOOD
RATE_AVERAGE_PAIN: 2
CARRYOUT_SOCIAL_ACTIVITIES: EXCELLENT
RATE_GENERAL_HEALTH: VERY GOOD
RATE_AVERAGE_FATIGUE: MILD
RATE_MENTAL_HEALTH: EXCELLENT
RATE_SOCIAL_SATISFACTION: VERY GOOD
CARRYOUT_PHYSICAL_ACTIVITIES: COMPLETELY
EMOTIONAL_PROBLEMS: NEVER
RATE_PHYSICAL_HEALTH: VERY GOOD

## 2025-05-20 ASSESSMENT — PAIN SCALES - GENERAL: PAINLEVEL_OUTOF10: 0-NO PAIN

## 2025-05-20 NOTE — ASSESSMENT & PLAN NOTE
Possible quad tear  No pain, no weakness  Will have sports med review to determine if any imaging or further intervention is necessary

## 2025-05-20 NOTE — ASSESSMENT & PLAN NOTE
Recommend obtaining a sleep study to evaluate  Pt with witnessed apnea, snoring, possible irregular leg movement and HTN that was difficult to manage, all could be related to KERI

## 2025-05-20 NOTE — ASSESSMENT & PLAN NOTE
Check labs to make sure no issues  This does not bother patient therefore can likely be monitored without intervention

## 2025-05-20 NOTE — PROGRESS NOTES
"Reason for Visit: Annual Physical Exam    HPI:  Right quad: February injury, pop and fine, went to follow up at ortho sports clinic, was doing ok afterwards, now in last couple weeks has noticed a bulge in the right thigh, no weakness, no pain or bruising, walking normally    Wife has noticed that patient has been snoring a lot as well as seems to be stopping breathing at times.  He also moves legs around frequently therefore she is concerned about sleep apnea.  Did have poorly controlled Htn for awhile but now under better control.  He does not note the problem as much.    Recently had a prostate biopsy. Awaiting results.    HTN: doing well with medication, no issues or problems    HLD: taking statin daily without a problem    Active Problem List  Problem List[1]    Comprehensive Medical/Surgical/Social/Family History  Medical History[2]  Surgical History[3]  Social History[4]  Family History[5]      Allergies and Medications  Patient has no known allergies.  Medications Ordered Prior to Encounter[6]      ROS otherwise negative aside from what was mentioned above in HPI.    Vitals  /78 (BP Location: Left arm, Patient Position: Sitting, BP Cuff Size: Large adult)   Pulse 60   Temp 36.3 °C (97.3 °F) (Temporal)   Ht 1.702 m (5' 7\")   Wt 74.9 kg (165 lb 3.2 oz)   BMI 25.87 kg/m²   Body mass index is 25.87 kg/m².  Physical Exam  Gen: Alert, NAD  HEENT:  PERRL, EOMI, conjunctiva and sclera normal in appearance. External auditory canals/TMs normal; Oral cavity and posterior pharynx without lesions/exudate  Neck:  Supple with FROM; No masses/nodes palpable; Thyroid nontender and without nodules; No JACOBY  Respiratory:  Lungs CTAB  Cardiovascular:  Heart RRR. No M/R/G. Peripheral pulses equal bilaterally  Extremities:  FROM all extremities; Muscle strength grossly normal with good tone, right thigh there is a proximal bulge, it is nontender or mobile  Neuro:  CN II-XII intact; Gross motor and sensory intact  Skin:  " No suspicious lesions present    Assessment and Plan:  Problem List Items Addressed This Visit       Benign essential hypertension    Current Assessment & Plan   Well controlled  Continue medication         Generalized anxiety disorder    Current Assessment & Plan   Stable          Snoring    Current Assessment & Plan   Recommend obtaining a sleep study to evaluate  Pt with witnessed apnea, snoring, possible irregular leg movement and HTN that was difficult to manage, all could be related to KERI         Relevant Orders    Home sleep apnea test (HSAT)    Restless leg syndrome    Current Assessment & Plan   Check labs to make sure no issues  This does not bother patient therefore can likely be monitored without intervention         Relevant Orders    Iron and TIBC    Ferritin    Home sleep apnea test (HSAT)    Health maintenance examination - Primary    Other specified injury of right quadriceps muscle, fascia and tendon, subsequent encounter    Current Assessment & Plan   Possible quad tear  No pain, no weakness  Will have sports med review to determine if any imaging or further intervention is necessary         Relevant Orders    Referral to Sports Medicine     Other Visit Diagnoses         Hyperlipemia, mixed        Relevant Orders    Lipid Panel      Elevated glucose        Relevant Orders    Hemoglobin A1C              Milly Alfredo MD         [1]   Patient Active Problem List  Diagnosis    Benign essential hypertension    Malignant tumor of prostate (Multi)    Generalized anxiety disorder    Mixed hyperlipidemia    Benign prostatic hyperplasia    Dyslipidemia    History of hypertension    History of malignant neoplasm of prostate    Hypertensive disorder    Melanocytic nevi of trunk    Neoplasm of uncertain behavior of skin    Other melanin hyperpigmentation    Other seborrheic keratosis    Scar condition and fibrosis of skin    Seborrheic dermatitis, unspecified    Skin changes due to chronic exposure to  nonionizing radiation, unspecified    Xerosis cutis    Snoring    Restless leg syndrome    Health maintenance examination    Other specified injury of right quadriceps muscle, fascia and tendon, subsequent encounter   [2]   Past Medical History:  Diagnosis Date    Anxiety 2022    Cancer (Multi) June 2019    Hyperlipidemia     Hypertension ?    Personal history of malignant neoplasm of prostate     History of malignant neoplasm of prostate    Personal history of other diseases of the circulatory system     History of hypertension    Prostate cancer (Multi)     Sleep apnea     Syncope    [3]   Past Surgical History:  Procedure Laterality Date    EYE SURGERY  1996    OTHER SURGICAL HISTORY  01/16/2020    Prostate biopsy    OTHER SURGICAL HISTORY      skin surgery    PROSTATE SURGERY  2022   [4]   Social History  Tobacco Use    Smoking status: Never     Passive exposure: Never    Smokeless tobacco: Never   Substance Use Topics    Alcohol use: Yes     Alcohol/week: 1.0 standard drink of alcohol     Types: 1 Glasses of wine per week     Comment: Minimal    Drug use: Never   [5]   Family History  Problem Relation Name Age of Onset    Hypertension Mother Margaurav     Blood Disorder Father Abraham     Cancer Brother Augustin     Cancer Brother Hernán     Cancer Brother Edy     Cancer Brother Augustin     Cancer Brother Hernán     Cancer Brother Edy    [6]   Current Outpatient Medications on File Prior to Visit   Medication Sig Dispense Refill    amLODIPine (Norvasc) 5 mg tablet TAKE 1 TABLET DAILY 90 tablet 3    escitalopram (Lexapro) 10 mg tablet TAKE 1 TABLET DAILY 90 tablet 3    ibuprofen 200 mg tablet Take 2 tablets (400 mg) by mouth every 6 hours if needed for mild pain (1 - 3).      losartan (Cozaar) 50 mg tablet TAKE 1 TABLET DAILY 90 tablet 3    metoprolol succinate XL (Toprol-XL) 50 mg 24 hr tablet TAKE 1 TABLET DAILY 90 tablet 3    simvastatin (Zocor) 40 mg tablet TAKE 1 TABLET DAILY AT BEDTIME 90 tablet 3     No  current facility-administered medications on file prior to visit.

## 2025-05-21 LAB
CHOLEST SERPL-MCNC: 118 MG/DL
CHOLEST/HDLC SERPL: 2.3 (CALC)
EST. AVERAGE GLUCOSE BLD GHB EST-MCNC: 134 MG/DL
EST. AVERAGE GLUCOSE BLD GHB EST-SCNC: 7.4 MMOL/L
FERRITIN SERPL-MCNC: 78 NG/ML (ref 24–380)
HBA1C MFR BLD: 6.3 %
HDLC SERPL-MCNC: 51 MG/DL
IRON SATN MFR SERPL: 22 % (CALC) (ref 20–48)
IRON SERPL-MCNC: 88 MCG/DL (ref 50–180)
LDLC SERPL CALC-MCNC: 47 MG/DL (CALC)
NONHDLC SERPL-MCNC: 67 MG/DL (CALC)
TIBC SERPL-MCNC: 393 MCG/DL (CALC) (ref 250–425)
TRIGL SERPL-MCNC: 113 MG/DL

## 2025-05-27 ENCOUNTER — PATIENT MESSAGE (OUTPATIENT)
Dept: PRIMARY CARE | Facility: CLINIC | Age: 70
End: 2025-05-27
Payer: COMMERCIAL

## 2025-05-28 ENCOUNTER — HOSPITAL ENCOUNTER (OUTPATIENT)
Dept: RADIOLOGY | Facility: CLINIC | Age: 70
Discharge: HOME | End: 2025-05-28
Payer: COMMERCIAL

## 2025-05-28 ENCOUNTER — OFFICE VISIT (OUTPATIENT)
Dept: ORTHOPEDIC SURGERY | Facility: CLINIC | Age: 70
End: 2025-05-28
Payer: COMMERCIAL

## 2025-05-28 DIAGNOSIS — S76.111A QUADRICEPS STRAIN, RIGHT, INITIAL ENCOUNTER: ICD-10-CM

## 2025-05-28 DIAGNOSIS — S76.191D OTHER SPECIFIED INJURY OF RIGHT QUADRICEPS MUSCLE, FASCIA AND TENDON, SUBSEQUENT ENCOUNTER: ICD-10-CM

## 2025-05-28 DIAGNOSIS — S76.111A QUADRICEPS STRAIN, RIGHT, INITIAL ENCOUNTER: Primary | ICD-10-CM

## 2025-05-28 DIAGNOSIS — R73.03 PREDIABETES: Primary | ICD-10-CM

## 2025-05-28 PROCEDURE — 73552 X-RAY EXAM OF FEMUR 2/>: CPT | Mod: RIGHT SIDE | Performed by: RADIOLOGY

## 2025-05-28 PROCEDURE — 73560 X-RAY EXAM OF KNEE 1 OR 2: CPT | Mod: RIGHT SIDE | Performed by: RADIOLOGY

## 2025-05-28 PROCEDURE — 99214 OFFICE O/P EST MOD 30 MIN: CPT | Performed by: PHYSICIAN ASSISTANT

## 2025-05-28 PROCEDURE — 73552 X-RAY EXAM OF FEMUR 2/>: CPT | Mod: RT

## 2025-05-28 PROCEDURE — 73560 X-RAY EXAM OF KNEE 1 OR 2: CPT | Mod: RT

## 2025-05-28 RX ORDER — METFORMIN HYDROCHLORIDE 500 MG/1
500 TABLET, EXTENDED RELEASE ORAL
Qty: 100 TABLET | Refills: 3 | Status: SHIPPED | OUTPATIENT
Start: 2025-05-28 | End: 2026-07-02

## 2025-05-28 ASSESSMENT — PAIN - FUNCTIONAL ASSESSMENT: PAIN_FUNCTIONAL_ASSESSMENT: NO/DENIES PAIN

## 2025-05-28 NOTE — PROGRESS NOTES
History of Present Illness  Medardo Mcdonald is a 69 y.o.s male with PMH HTN, HLD, and pre-diabetes presenting for evaluation of side: right knee pain/deformity for the past 3 months. Sx started with kneeling and localized to right suprapatellar area. While kneeling felt a tearing sensation and had severe pain which required EMS assistance to get him into his house. The next morning his pain was improved but he still had an obvious deformity/dip just above his knee. Seen by [facility] provider and told no fx. Also had follow up with injury clinic here without intervention. Since then, he has continued to have a deformity/dip superior to the knee cap. Pain is nearly resolved but concerned about the persistent deformity. Never had issues with the knee in the past. Bikes and walks frequently. Denies numbness, tingling, f/c, n/v, CP, SOB, or any other complaints/concerns.      Medical History[1]    Medication Documentation Review Audit       Reviewed by Milly Alfredo MD (Physician) on 05/20/25 at 1006      Medication Order Taking? Sig Documenting Provider Last Dose Status   amLODIPine (Norvasc) 5 mg tablet [id number] Yes TAKE 1 TABLET DAILY Milly Alfredo MD 5/15/2025  5:15 AM Active   escitalopram (Lexapro) 10 mg tablet [id number] Yes TAKE 1 TABLET DAILY Milly Alfredo MD 5/15/2025  5:15 AM Active   ibuprofen 200 mg tablet [id number] Yes Take 2 tablets (400 mg) by mouth every 6 hours if needed for mild pain (1 - 3). Historical Provider, MD Past Month Active   losartan (Cozaar) 50 mg tablet [id number] Yes TAKE 1 TABLET DAILY Milly Alfredo MD 5/14/2025 Active   metoprolol succinate XL (Toprol-XL) 50 mg 24 hr tablet [id number] Yes TAKE 1 TABLET DAILY Milly Alfredo MD 5/15/2025  5:15 AM Active   simvastatin (Zocor) 40 mg tablet [id number] Yes TAKE 1 TABLET DAILY AT BEDTIME Milly Alfredo MD 5/14/2025 Active                    RX Allergies[2]    Social History     Socioeconomic History    Marital status:      Spouse name: Not on file    Number of  children: Not on file    Years of education: Not on file    Highest education level: Not on file   Occupational History    Not on file   Tobacco Use    Smoking status: Never     Passive exposure: Never    Smokeless tobacco: Never   Vaping Use    Vaping status: Not on file   Substance and Sexual Activity    Alcohol use: Yes     Alcohol/week: 1.0 standard drink of alcohol     Types: 1 Glasses of wine per week     Comment: Minimal    Drug use: Never    Sexual activity: Not on file   Other Topics Concern    Not on file   Social History Narrative    Not on file     Social Drivers of Health     Financial Resource Strain: Not on file   Food Insecurity: Not on file   Transportation Needs: Not on file   Physical Activity: Not on file   Stress: Not on file   Social Connections: Not on file   Intimate Partner Violence: Not on file   Housing Stability: Not on file       Surgical History[3]     Review of Systems   30 point ROS reviewed and negative other than as listed in the HPI.      Exam  Gen: The pt is A&Ox3, NAD, and appear state age and weight  Psychiatric: mood and affect are appropriate   Eyes: sclera are white, EOM grossly intact  ENT: MMM  Neck: supple, thyroid is midline  Respiratory: respirations are nonlabored, chest rise symmetric  CV: rate is regular by palpation of distal pulses  Abdomen: nondistended   Integument: no obvious cutaneous lesions noted. No signs of lymphangitis. No signs of systemic edema.   MSK:  Musculoskeletal examination of the right knee demonstrate no abrasions and no effusion. There is an obvious defect in the suprapatellar area.  Knee range of motion is painful but demonstrates full extension to beyond 90° of flexion. The knee is stable to varus and valgus stress at zero and 30° of flexion. Lachman's test and posterior drawer are negative. Medial and lateral Regla's tests are negative. Patellar apprehension is negative. Patellar grind test is negative. Sensation is intact to light touch  in the tibial, sural, saphenous, superficial peroneal, and deep peroneal nerve distributions. Foot is warm and well-perfused. He has 5/5 strength in knee flexion/extension but has accentuation of the deformity     Imaging:  I personally reviewed multiple views of the R knee were obtained in the office today demonstrate no acute fx or dislocation; there is deformity of the muscle visible on lateral of the quad tendon/distal muscle belly      Assessment:  Concern for right quad tendon tear    Plan:  Based on hx and exam there is concern for quad tendon injury. At this time, with the persistent deformity and his activity level, would get MRI to assess for tearing. We reviewed activity modification while awaiting MRI.     Natural history reviewed. All of the pt questions/concerns addressed and they are in agreement with the plan.         [1]   Past Medical History:  Diagnosis Date    Anxiety 2022    Cancer (Multi) June 2019    Hyperlipidemia     Hypertension ?    Personal history of malignant neoplasm of prostate     History of malignant neoplasm of prostate    Personal history of other diseases of the circulatory system     History of hypertension    Prostate cancer (Multi)     Sleep apnea     Syncope    [2] No Known Allergies  [3]   Past Surgical History:  Procedure Laterality Date    EYE SURGERY  1996    OTHER SURGICAL HISTORY  01/16/2020    Prostate biopsy    OTHER SURGICAL HISTORY      skin surgery    PROSTATE SURGERY  2022

## 2025-05-30 LAB
LAB AP ASR DISCLAIMER: NORMAL
LABORATORY COMMENT REPORT: NORMAL
PATH REPORT.FINAL DX SPEC: NORMAL
PATH REPORT.GROSS SPEC: NORMAL
PATH REPORT.RELEVANT HX SPEC: NORMAL
PATH REPORT.TOTAL CANCER: NORMAL

## 2025-06-04 ENCOUNTER — APPOINTMENT (OUTPATIENT)
Dept: RADIOLOGY | Facility: CLINIC | Age: 70
End: 2025-06-04
Payer: COMMERCIAL

## 2025-06-04 DIAGNOSIS — S76.191D OTHER SPECIFIED INJURY OF RIGHT QUADRICEPS MUSCLE, FASCIA AND TENDON, SUBSEQUENT ENCOUNTER: ICD-10-CM

## 2025-06-04 PROCEDURE — 73718 MRI LOWER EXTREMITY W/O DYE: CPT | Mod: RT

## 2025-06-20 ENCOUNTER — OFFICE VISIT (OUTPATIENT)
Dept: ORTHOPEDIC SURGERY | Facility: CLINIC | Age: 70
End: 2025-06-20
Payer: COMMERCIAL

## 2025-06-20 DIAGNOSIS — S76.112S QUADRICEPS STRAIN, LEFT, SEQUELA: Primary | ICD-10-CM

## 2025-06-20 PROCEDURE — 99213 OFFICE O/P EST LOW 20 MIN: CPT | Performed by: STUDENT IN AN ORGANIZED HEALTH CARE EDUCATION/TRAINING PROGRAM

## 2025-06-20 NOTE — LETTER
June 20, 2025     Claudette Ramirez PA-C  [id number]  Department Of Orthopedics  [facility] [id number]    Patient: Medardo Mcdonald   YOB: 1955   Date of Visit: 6/20/2025       Dear Dr. Claudette Ramirez PA-C:    Thank you for referring Medardo Mcdonald to me for evaluation. Below are my notes for this consultation.  If you have questions, please do not hesitate to call me. I look forward to following your patient along with you.       Sincerely,     Florencio Shaw MD      CC: No Recipients  ______________________________________________________________________________________    Medardo Mcdonald  is a 69 y.o. year-old  male. he  is a new patient to our office and presents with a chief complaint of Right  knee thigh pain.  He notes in February he was playing a board game went to bend over felt a sudden tearing sensation in his right thigh and heard a pop.  He felt okay in the ensuing days but developed a lump and some swelling in that area that has not really responded.  He was referred by Dianne Ramirez after an MRI was completed demonstrating a quad tendon strain with hematoma.  He is actually not bothered by this he has no pain he has been doing full activities he just notices the lump in his thigh      Past Medical, Family, and Social History reviewed   Review of Systems  A complete review of systems was conducted, pertinent only to the HPI noted above.  Constitutional: None  Eyes: No additions to above history  Ears, Nose, Throat: No additions to above history  Cardiovascular: No additions to above history  Respiratory: No additions to above history  GI: No additions to above history  : No additions to above history  Skin/Neuro: No additions to above history  Endocrine/Heme/Lymph: No additions to above history  Immunologic: No additions to above history  Psychiatric: No additions to above history  Musculoskeletal: see above    GEN: Alert and Oriented x 3  Constitutional: Well appearing , in no apparent  distress.  Skin: No rashes, erythema, or induration around knee    MUSCULOSKELETAL EXAM:     Right KNEE:  ROM: 0/0/130  Effusion: negative  Alignment: [neutral]      Gait: [normal]  Sensation intact bilaterally sural/saphenous/sp/dp/tibial nerve bilaterally  Motor 5/5 knee flexion/extension/foot DF/PF/EHL/FHL bilaterally  Palpable/symmetric DP and PT pulse bilaterally      Right thigh was examined there is a palpable lump in the mid body of his rectus femoris muscle its nontender it is not hot warm and demonstrates no evidence of infection he has good quad extension strength there is no defect palpable    Xrays and MRI independently viewed and interpreted: Appears to be an intratendinous muscle strain of the rectus femoris with a hematoma fluid collection incidentally found lesion in the femoral head which was noted by radiology to be nonaggressive in nature and stable since a previous CT scan in 2018    I reviewed with the patient over quad muscle strain with a resultant hematoma.  We discussed this likely will resolve with continued time.  We discussed the option of ultrasound-guided aspiration however he has really no symptoms from this and he elected not to proceed we did give him the name of a partner should he like to consider this in the future.  Also reviewed the lesion in his femoral head which appears to be stable and nonaggressive and no further imaging was recommended by radiology..  All questions were answered he is in agreement with this plan.

## 2025-06-20 NOTE — PROGRESS NOTES
Medardo Mcdonald  is a 69 y.o. year-old  male. he  is a new patient to our office and presents with a chief complaint of Right  knee thigh pain.  He notes in February he was playing a board game went to bend over felt a sudden tearing sensation in his right thigh and heard a pop.  He felt okay in the ensuing days but developed a lump and some swelling in that area that has not really responded.  He was referred by Dianne Ramirez after an MRI was completed demonstrating a quad tendon strain with hematoma.  He is actually not bothered by this he has no pain he has been doing full activities he just notices the lump in his thigh      Past Medical, Family, and Social History reviewed   Review of Systems  A complete review of systems was conducted, pertinent only to the HPI noted above.  Constitutional: None  Eyes: No additions to above history  Ears, Nose, Throat: No additions to above history  Cardiovascular: No additions to above history  Respiratory: No additions to above history  GI: No additions to above history  : No additions to above history  Skin/Neuro: No additions to above history  Endocrine/Heme/Lymph: No additions to above history  Immunologic: No additions to above history  Psychiatric: No additions to above history  Musculoskeletal: see above    GEN: Alert and Oriented x 3  Constitutional: Well appearing , in no apparent distress.  Skin: No rashes, erythema, or induration around knee    MUSCULOSKELETAL EXAM:     Right KNEE:  ROM: 0/0/130  Effusion: negative  Alignment: [neutral]      Gait: [normal]  Sensation intact bilaterally sural/saphenous/sp/dp/tibial nerve bilaterally  Motor 5/5 knee flexion/extension/foot DF/PF/EHL/FHL bilaterally  Palpable/symmetric DP and PT pulse bilaterally      Right thigh was examined there is a palpable lump in the mid body of his rectus femoris muscle its nontender it is not hot warm and demonstrates no evidence of infection he has good quad extension strength there is no  defect palpable    Xrays and MRI independently viewed and interpreted: Appears to be an intratendinous muscle strain of the rectus femoris with a hematoma fluid collection incidentally found lesion in the femoral head which was noted by radiology to be nonaggressive in nature and stable since a previous CT scan in 2018    I reviewed with the patient over quad muscle strain with a resultant hematoma.  We discussed this likely will resolve with continued time.  We discussed the option of ultrasound-guided aspiration however he has really no symptoms from this and he elected not to proceed we did give him the name of a partner should he like to consider this in the future.  Also reviewed the lesion in his femoral head which appears to be stable and nonaggressive and no further imaging was recommended by radiology..  All questions were answered he is in agreement with this plan.

## 2025-06-21 DIAGNOSIS — F41.9 ANXIETY: ICD-10-CM

## 2025-06-23 RX ORDER — ESCITALOPRAM OXALATE 10 MG/1
10 TABLET ORAL DAILY
Qty: 90 TABLET | Refills: 3 | Status: SHIPPED | OUTPATIENT
Start: 2025-06-23

## 2025-07-28 DIAGNOSIS — I10 BENIGN ESSENTIAL HYPERTENSION: ICD-10-CM

## 2025-07-28 RX ORDER — LOSARTAN POTASSIUM 50 MG/1
50 TABLET ORAL DAILY
Qty: 90 TABLET | Refills: 3 | Status: SHIPPED | OUTPATIENT
Start: 2025-07-28

## 2025-08-02 DIAGNOSIS — R73.03 PREDIABETES: ICD-10-CM

## 2025-08-04 ENCOUNTER — CLINICAL SUPPORT (OUTPATIENT)
Dept: SLEEP MEDICINE | Facility: CLINIC | Age: 70
End: 2025-08-04
Payer: COMMERCIAL

## 2025-08-04 DIAGNOSIS — R06.83 SNORING: ICD-10-CM

## 2025-08-04 DIAGNOSIS — G25.81 RESTLESS LEG SYNDROME: ICD-10-CM

## 2025-08-04 NOTE — PROGRESS NOTES
Type of Study: HOME SLEEP STUDY - NOMAD     The patient received equipment and instructions for use of the [employer] [facility] Nomad HSAT device. The patient was instructed how to apply the effort belts, cannula, thermistor. It was also explained how the Nomad and oximeter components work.  The patient was asked to record their sleep for an 8-hour period.     The patient was informed of their responsibility for the device and acknowledged this by signing the HSAT device contract. The patient was asked to return the device on 08/05/2025 between the hours of 08:00 - 15:00  to the [facility].     The patient was instructed to call 911 as usual for any medical- emergencies while at home.  The patient was also given a phone number for troubleshooting when using the device in case there were additional questions.

## 2025-08-05 RX ORDER — METFORMIN HYDROCHLORIDE 500 MG/1
500 TABLET, EXTENDED RELEASE ORAL
Qty: 90 TABLET | Refills: 3 | Status: SHIPPED | OUTPATIENT
Start: 2025-08-05

## 2025-11-20 ENCOUNTER — APPOINTMENT (OUTPATIENT)
Dept: PRIMARY CARE | Facility: CLINIC | Age: 70
End: 2025-11-20
Payer: COMMERCIAL

## (undated) DEVICE — NEEDLE, BIOPSY, MAX CORE, 18G

## (undated) DEVICE — Device

## (undated) DEVICE — BLANKET, LOWER BODY, VHA PLUS, ADULT

## (undated) DEVICE — GLOVE, SURGICAL, PROTEXIS MICRO, 7.0, PF, LATEX

## (undated) DEVICE — SOLUTION, IRRIGATION, X RX SODIUM CHL 0.9%, 1000ML BTL

## (undated) DEVICE — GOWN, SURGICAL, SIRUS, NON REINFORCED, LARGE